# Patient Record
Sex: MALE | Race: WHITE | URBAN - METROPOLITAN AREA
[De-identification: names, ages, dates, MRNs, and addresses within clinical notes are randomized per-mention and may not be internally consistent; named-entity substitution may affect disease eponyms.]

---

## 2022-06-07 ENCOUNTER — OFFICE VISIT (OUTPATIENT)
Dept: SURGERY | Facility: CLINIC | Age: 22
End: 2022-06-07
Payer: MEDICAID

## 2022-06-07 VITALS — DIASTOLIC BLOOD PRESSURE: 83 MMHG | SYSTOLIC BLOOD PRESSURE: 126 MMHG

## 2022-06-07 DIAGNOSIS — N32.81 OAB (OVERACTIVE BLADDER): Primary | ICD-10-CM

## 2022-06-07 PROCEDURE — 3079F DIAST BP 80-89 MM HG: CPT | Performed by: NURSE PRACTITIONER

## 2022-06-07 PROCEDURE — 3074F SYST BP LT 130 MM HG: CPT | Performed by: NURSE PRACTITIONER

## 2022-06-07 PROCEDURE — 99214 OFFICE O/P EST MOD 30 MIN: CPT | Performed by: NURSE PRACTITIONER

## 2022-06-07 RX ORDER — SOLIFENACIN SUCCINATE 5 MG/1
5 TABLET, FILM COATED ORAL DAILY
Qty: 90 TABLET | Refills: 0 | Status: SHIPPED | OUTPATIENT
Start: 2022-06-07

## 2022-06-16 ENCOUNTER — HOSPITAL ENCOUNTER (OUTPATIENT)
Dept: ULTRASOUND IMAGING | Age: 22
Discharge: HOME | End: 2022-06-16
Attending: NURSE PRACTITIONER
Payer: MEDICAID

## 2022-06-16 DIAGNOSIS — R79.89 ELEVATED LFTS: ICD-10-CM

## 2022-06-16 PROCEDURE — 76705 ECHO EXAM OF ABDOMEN: CPT | Performed by: NURSE PRACTITIONER

## 2022-07-05 ENCOUNTER — OFFICE VISIT (OUTPATIENT)
Dept: SURGERY | Facility: CLINIC | Age: 22
End: 2022-07-05
Payer: MEDICAID

## 2022-07-05 DIAGNOSIS — N32.81 OAB (OVERACTIVE BLADDER): Primary | ICD-10-CM

## 2022-07-05 PROCEDURE — 99213 OFFICE O/P EST LOW 20 MIN: CPT | Performed by: NURSE PRACTITIONER

## 2022-08-15 ENCOUNTER — OFFICE VISIT (OUTPATIENT)
Dept: SURGERY | Facility: CLINIC | Age: 22
End: 2022-08-15
Payer: MEDICAID

## 2022-08-15 DIAGNOSIS — N32.81 OAB (OVERACTIVE BLADDER): Primary | ICD-10-CM

## 2022-08-15 PROCEDURE — 51798 US URINE CAPACITY MEASURE: CPT | Performed by: NURSE PRACTITIONER

## 2022-08-15 PROCEDURE — 99213 OFFICE O/P EST LOW 20 MIN: CPT | Performed by: NURSE PRACTITIONER

## 2022-11-13 PROBLEM — K58.2 IRRITABLE BOWEL SYNDROME WITH BOTH CONSTIPATION AND DIARRHEA: Status: ACTIVE | Noted: 2022-11-13

## 2022-11-13 PROBLEM — F41.8 DEPRESSION WITH ANXIETY: Status: ACTIVE | Noted: 2022-11-13

## 2022-11-27 DIAGNOSIS — K58.2 IRRITABLE BOWEL SYNDROME WITH BOTH CONSTIPATION AND DIARRHEA: ICD-10-CM

## 2022-11-28 RX ORDER — DICYCLOMINE HYDROCHLORIDE 10 MG/1
10 CAPSULE ORAL 4 TIMES DAILY
Qty: 120 CAPSULE | Refills: 0 | Status: SHIPPED | OUTPATIENT
Start: 2022-11-28

## 2022-11-28 NOTE — TELEPHONE ENCOUNTER
Refill passed per [facility] protocol.   Requested Prescriptions   Pending Prescriptions Disp Refills    DICYCLOMINE 10 MG Oral Cap [Pharmacy Med Name: DICYCLOMINE 10MG CAPSULES] 120 capsule 0     Sig: TAKE 1 CAPSULE(10 MG) BY MOUTH FOUR TIMES DAILY       Gastrointestional Medication Protocol Passed - 11/27/2022  4:32 PM        Passed - In person appointment or virtual visit in the past 12 mos or appointment in next 3 mos     Recent Outpatient Visits              2 weeks ago Depression with anxiety    [facility] Celi Gaytan PA-C    Office Visit    3 months ago OAB (overactive bladder)    [facility], [address], [employer], APRN    Office Visit    4 months ago OAB (overactive bladder)    [facility], [address], [employer], Maria Elena Guess, APRN    Office Visit    5 months ago OAB (overactive bladder)    [facility], [address], [employer] Gu[employer], APRN    Office Visit    6 months ago Elevated LFTs    [facility], [facility] [address] [facility] [place], [address]    Office Visit                         Recent Outpatient Visits              2 weeks ago Depression with anxiety    [facility] Celi Gaytan PA-C    Office Visit    3 months ago OAB (overactive bladder)    [facility], [address], [employer], APRN    Office Visit    4 months ago OAB (overactive bladder)    [facility], [address], [employer], APRN    Office Visit    5 months ago OAB (overactive bladder)    [facility], [address], [employer], APRN    Office Visit    6 months ago Elevated LFTs    [facility] [address] [facility] [place], [address]    Office Visit

## 2022-12-08 ENCOUNTER — HOSPITAL ENCOUNTER (OUTPATIENT)
Dept: GENERAL RADIOLOGY | Age: 22
Discharge: HOME | End: 2022-12-08
Attending: PHYSICIAN ASSISTANT
Payer: MEDICAID

## 2022-12-08 DIAGNOSIS — M25.572 CHRONIC PAIN OF BOTH ANKLES: ICD-10-CM

## 2022-12-08 DIAGNOSIS — G89.29 CHRONIC PAIN OF BOTH ANKLES: ICD-10-CM

## 2022-12-08 DIAGNOSIS — M25.571 CHRONIC PAIN OF BOTH ANKLES: ICD-10-CM

## 2022-12-08 PROCEDURE — 73610 X-RAY EXAM OF ANKLE: CPT | Performed by: PHYSICIAN ASSISTANT

## 2023-01-03 ENCOUNTER — OFFICE VISIT (OUTPATIENT)
Dept: PODIATRY CLINIC | Facility: CLINIC | Age: 23
End: 2023-01-03
Payer: MEDICAID

## 2023-01-03 DIAGNOSIS — M21.6X1 EQUINUS DEFORMITY OF BOTH FEET: ICD-10-CM

## 2023-01-03 DIAGNOSIS — M76.62 ACHILLES TENDINITIS OF BOTH LOWER EXTREMITIES: Primary | ICD-10-CM

## 2023-01-03 DIAGNOSIS — M21.6X2 EQUINUS DEFORMITY OF BOTH FEET: ICD-10-CM

## 2023-01-03 DIAGNOSIS — M76.61 ACHILLES TENDINITIS OF BOTH LOWER EXTREMITIES: Primary | ICD-10-CM

## 2023-01-03 PROCEDURE — 99203 OFFICE O/P NEW LOW 30 MIN: CPT | Performed by: PODIATRIST

## 2023-01-18 ENCOUNTER — OFFICE VISIT (OUTPATIENT)
Dept: PHYSICAL THERAPY | Age: 23
End: 2023-01-18
Attending: PODIATRIST
Payer: MEDICAID

## 2023-01-18 DIAGNOSIS — M76.62 ACHILLES TENDINITIS OF BOTH LOWER EXTREMITIES: ICD-10-CM

## 2023-01-18 DIAGNOSIS — M76.61 ACHILLES TENDINITIS OF BOTH LOWER EXTREMITIES: ICD-10-CM

## 2023-01-18 DIAGNOSIS — M21.6X1 EQUINUS DEFORMITY OF BOTH FEET: ICD-10-CM

## 2023-01-18 DIAGNOSIS — M21.6X2 EQUINUS DEFORMITY OF BOTH FEET: ICD-10-CM

## 2023-01-18 PROCEDURE — 97110 THERAPEUTIC EXERCISES: CPT

## 2023-01-18 PROCEDURE — 97161 PT EVAL LOW COMPLEX 20 MIN: CPT

## 2023-01-18 NOTE — PATIENT INSTRUCTIONS
HEP    Ankle DF/PF/INV/EV with Blue tband 2 x 10   Standing B heel raises 2 x 10  Standing R/L gastroc stretch at wall 3 x 30 sec holds      2x/day

## 2023-01-25 ENCOUNTER — OFFICE VISIT (OUTPATIENT)
Dept: PHYSICAL THERAPY | Age: 23
End: 2023-01-25
Attending: PODIATRIST
Payer: MEDICAID

## 2023-01-25 PROCEDURE — 97110 THERAPEUTIC EXERCISES: CPT

## 2023-01-25 PROCEDURE — 97112 NEUROMUSCULAR REEDUCATION: CPT

## 2023-01-25 NOTE — PROGRESS NOTES
Diagnosis: Achilles tendinitis of both lower extremities (M76.61,M76.62)  Equinus deformity of both feet (M21.6X1,M21.6X2)           Next MD visit: none scheduled  Fall Risk: standard         Precautions: n/a          Medication Changes since last visit?: No    Subjective: Patient reports no current pain. Objective:     Date: 1/25/2023  Visit #: 2/11 ([facility]) exp. 3/19/2023   HEP   -   Therapeutic Exercise   - standing B heel raises on slant board level 4 2 x 10  - standing B gastroc stretch on slant board level 5 3 x 30 sec holds   - standing R/L hip abduction with hip extension with GTB at shins 2 x 10 ea  - seated B toe/towel scrunches 1 minute x 2 reps  - supine R/L ankle DF with INV, DF with EV with blue Tband 2 x 10 ea  - supine R/L ankle PF with INV, PF with EV with blue Tband 2 x 10 ea  - shuttle machine R/L knee ext/flx 8 bands 2 x 10 ea  - standing R/L heel raises 2 x 10 ea   Manual Therapy   -   Therapeutic Activity   -   Modalities      Neuromuscular re-ed   - R/L SLS 2 x 30 sec holds each  - R/L tandem stance on airex 2 x 30 sec holds each  - R/L SLS with SB toss at wall 2 x 10 ea  - standing on rockerboard AP taps 2 x 20 reps       Assessment: Session focused on global B ankle strengthening and balance challenges.         Plan: continue PT    Charges: Ex 2 NM 1    Total Timed Treatment: 40 min  Total Treatment Time: 40 min

## 2023-01-30 ENCOUNTER — OFFICE VISIT (OUTPATIENT)
Dept: PHYSICAL THERAPY | Age: 23
End: 2023-01-30
Attending: PODIATRIST
Payer: MEDICAID

## 2023-01-30 PROCEDURE — 97112 NEUROMUSCULAR REEDUCATION: CPT

## 2023-01-30 PROCEDURE — 97110 THERAPEUTIC EXERCISES: CPT

## 2023-01-30 NOTE — PROGRESS NOTES
Diagnosis: Achilles tendinitis of both lower extremities (M76.61,M76.62)  Equinus deformity of both feet (M21.6X1,M21.6X2)           Next MD visit: none scheduled  Fall Risk: standard         Precautions: n/a          Medication Changes since last visit?: No    Subjective: Patient reports no current pain. He reports his feet are getting better.         Objective:     Date: 1/30/2023  Visit #: 3/11 ([facility]) exp. 3/19/2023 Date: 1/25/2023  Visit #: 2/11 ([facility]) exp. 3/19/2023   HEP    -   Therapeutic Exercise   - standing B heel raises with IR/ER 1 x 10 ea  - standing B heel raises on slant board level 4 2 x 10  - standing B gastroc stretch on slant board level 5 3 x 30 sec holds   - standing R/L hip abduction with hip extension with GTB at shins 7# DB 2 x 10 ea  - seated B toe/towel scrunches 1 minute x 2 reps  - supine R/L ankle DF with INV, DF with EV with blue Tband 2 x 10 ea  - supine R/L ankle PF with INV, PF with EV with blue Tband 2 x 10 ea  - shuttle machine R/L knee ext/flx 8 bands 2 x 10 ea  - standing B heel raises off stair with R/L eccentric lowering 1 x 10 ea - standing B heel raises on slant board level 4 2 x 10  - standing B gastroc stretch on slant board level 5 3 x 30 sec holds   - standing R/L hip abduction with hip extension with GTB at shins 2 x 10 ea  - seated B toe/towel scrunches 1 minute x 2 reps  - supine R/L ankle DF with INV, DF with EV with blue Tband 2 x 10 ea  - supine R/L ankle PF with INV, PF with EV with blue Tband 2 x 10 ea  - shuttle machine R/L knee ext/flx 8 bands 2 x 10 ea  - standing R/L heel raises 2 x 10 ea   Manual Therapy    -   Therapeutic Activity    -   Modalities       Neuromuscular re-ed   - R/L tandem stance on airex 3 x 30 sec holds each  - R/L SLS with SB toss at wall 2 x 10 ea  - standing on rockerboard AP taps 2 x 20 reps - R/L SLS 2 x 30 sec holds each  - R/L tandem stance on airex 2 x 30 sec holds each  - R/L SLS with SB toss at wall 2 x 10 ea  - standing on rockerboard AP taps 2 x 20 reps       Assessment: Advanced gastroc strengthening with initiation of eccentric heel raises and with multi-angle raises as well.        Plan: continue PT    Charges: Ex 3 NM 1    Total Timed Treatment: 46 min  Total Treatment Time: 46 min

## 2023-02-01 ENCOUNTER — OFFICE VISIT (OUTPATIENT)
Dept: PHYSICAL THERAPY | Age: 23
End: 2023-02-01
Attending: PODIATRIST
Payer: MEDICAID

## 2023-02-01 PROCEDURE — 97112 NEUROMUSCULAR REEDUCATION: CPT

## 2023-02-01 PROCEDURE — 97110 THERAPEUTIC EXERCISES: CPT

## 2023-02-01 NOTE — PROGRESS NOTES
Diagnosis: Achilles tendinitis of both lower extremities (M76.61,M76.62)  Equinus deformity of both feet (M21.6X1,M21.6X2)           Next MD visit: none scheduled  Fall Risk: standard         Precautions: n/a          Medication Changes since last visit?: No    Subjective: Patient reports no current pain. He reports he does have some soreness in the popliteal fossa area/proximal gastroc area today of his right leg.       Objective:     Date: 2/1/2023  Visit #: 4/11 [facility]) exp. 3/19/2023 Date: 1/30/2023  Visit #: 3/11 ([facility]) exp. 3/19/2023 Date: 1/25/2023  Visit #: 2/11 ([facility]) exp. 3/19/2023   HEP     -   Therapeutic Exercise   X 27 minutes  - standing B heel raises on slant board level 4 3 x 10  - standing B gastroc stretch on slant board level 5 3 x 30 sec holds  - R/L forward step ups to 14 inch step with R/L opposite LE march 1 x 10 ea  - standing R/L hip abduction with hip extension with 7# DB and GTB at ankles 2 x 10 ea  - long sitting R/L ankle PF with INV, PF with EV with blue Tband 2 x 10 ea  - long sitting R/L ankle DF with INV, DF with EV with blue Tband 2 x 10 ea - standing B heel raises with IR/ER 1 x 10 ea  - standing B heel raises on slant board level 4 2 x 10  - standing B gastroc stretch on slant board level 5 3 x 30 sec holds   - standing R/L hip abduction with hip extension with GTB at shins 7# DB 2 x 10 ea  - seated B toe/towel scrunches 1 minute x 2 reps  - supine R/L ankle DF with INV, DF with EV with blue Tband 2 x 10 ea  - supine R/L ankle PF with INV, PF with EV with blue Tband 2 x 10 ea  - shuttle machine R/L knee ext/flx 8 bands 2 x 10 ea  - standing B heel raises off stair with R/L eccentric lowering 1 x 10 ea - standing B heel raises on slant board level 4 2 x 10  - standing B gastroc stretch on slant board level 5 3 x 30 sec holds   - standing R/L hip abduction with hip extension with GTB at shins 2 x 10 ea  - seated B toe/towel scrunches 1 minute x 2 reps  - supine R/L ankle DF with INV, DF with EV with blue Tband 2 x 10 ea  - supine R/L ankle PF with INV, PF with EV with blue Tband 2 x 10 ea  - shuttle machine R/L knee ext/flx 8 bands 2 x 10 ea  - standing R/L heel raises 2 x 10 ea   Manual Therapy     -   Therapeutic Activity     -   Modalities        Neuromuscular re-ed   X 15 minutes  - R/L SLS with forward reach 1 x 10  - R/L RDL 1 x 10 ea  - R/L SLS on airex with R/L AP, ML taps 1 x 10 ea  - standing on rockerboard AP taps 20x  - R/L SLS on rockerboard with AP taps 2 x 10 ea - R/L tandem stance on airex 3 x 30 sec holds each  - R/L SLS with SB toss at wall 2 x 10 ea  - standing on rockerboard AP taps 2 x 20 reps - R/L SLS 2 x 30 sec holds each  - R/L tandem stance on airex 2 x 30 sec holds each  - R/L SLS with SB toss at wall 2 x 10 ea  - standing on rockerboard AP taps 2 x 20 reps       Assessment: Advanced single leg dynamic challenges this session along with hip strengthening therapeutic exercises. Patient reporting increased fatigue in left foot.     Plan: continue PT    Charges: Ex 2 NM 1   Total Timed Treatment: 42 min  Total Treatment Time: 42 min

## 2023-02-07 ENCOUNTER — OFFICE VISIT (OUTPATIENT)
Dept: PHYSICAL THERAPY | Age: 23
End: 2023-02-07
Attending: PODIATRIST
Payer: MEDICAID

## 2023-02-07 PROCEDURE — 97110 THERAPEUTIC EXERCISES: CPT

## 2023-02-07 NOTE — PROGRESS NOTES
Diagnosis: Achilles tendinitis of both lower extremities (M76.61,M76.62)  Equinus deformity of both feet (M21.6X1,M21.6X2)           Next MD visit: none scheduled  Fall Risk: standard         Precautions: n/a          Medication Changes since last visit?: No    Subjective: Patient reports no current pain.         Objective:     Date: 2/7/2023  Visit #: 5/11 [facility]) exp. 3/19/2023 Date: 2/1/2023  Visit #: 4/11 ([facility]) exp. 3/19/2023 Date: 1/30/2023  Visit #: 3/11 ([facility]) exp. 3/19/2023   HEP        Therapeutic Exercise   - standing R/L eccentric heel raises off stair 6 inch step 2 x 10 ea  - standing R/L forward step up to 14 inch step with opposite LE march 2 x 10 ea  - standing B gastroc stretch on slant board level 5 3 x 30 sec holds  - standing R/L hip abduction with hip extension with 7# DB and GTB at ankles 2 x 10 ea  - shuttle machine R/L knee ext/flx 8 bands 2 x 10 ea  - long sitting R/L ankle PF with black Tband 2 x 10 ea 5 sec holds  - long sitting R/L ankle INV/EV/DF with black Tband 2 x 10 ea  - shuttle machine R/L knee ext/flx 8 bands 2 x 10 ea X 27 minutes  - standing B heel raises on slant board level 4 3 x 10  - standing B gastroc stretch on slant board level 5 3 x 30 sec holds  - R/L forward step ups to 14 inch step with R/L opposite LE march 1 x 10 ea  - standing R/L hip abduction with hip extension with 7# DB and GTB at ankles 2 x 10 ea  - long sitting R/L ankle PF with INV, PF with EV with blue Tband 2 x 10 ea  - long sitting R/L ankle DF with INV, DF with EV with blue Tband 2 x 10 ea - standing B heel raises with IR/ER 1 x 10 ea  - standing B heel raises on slant board level 4 2 x 10  - standing B gastroc stretch on slant board level 5 3 x 30 sec holds   - standing R/L hip abduction with hip extension with GTB at shins 7# DB 2 x 10 ea  - seated B toe/towel scrunches 1 minute x 2 reps  - supine R/L ankle DF with INV, DF with EV with blue Tband 2 x 10 ea  - supine R/L ankle PF with INV, PF with EV with blue Tband 2 x 10 ea  - shuttle machine R/L knee ext/flx 8 bands 2 x 10 ea  - standing B heel raises off stair with R/L eccentric lowering 1 x 10 ea   Manual Therapy        Therapeutic Activity        Modalities        Neuromuscular re-ed   - standing R/L SLS with 5 cone taps 5 reps each  - standing on rockerboard AP taps 2 x 20 reps X 15 minutes  - R/L SLS with forward reach 1 x 10  - R/L RDL 1 x 10 ea  - R/L SLS on airex with R/L AP, ML taps 1 x 10 ea  - standing on rockerboard AP taps 20x  - R/L SLS on rockerboard with AP taps 2 x 10 ea - R/L tandem stance on airex 3 x 30 sec holds each  - R/L SLS with SB toss at wall 2 x 10 ea  - standing on rockerboard AP taps 2 x 20 reps       Assessment: Advanced reps of all therapeutic exercises with focus on continuation of gastroc and hip strengthening.     Plan: continue PT    Charges: Ex 3   Total Timed Treatment: 45 min  Total Treatment Time: 45 min

## 2023-02-09 ENCOUNTER — OFFICE VISIT (OUTPATIENT)
Dept: PHYSICAL THERAPY | Age: 23
End: 2023-02-09
Attending: PODIATRIST
Payer: MEDICAID

## 2023-02-09 PROCEDURE — 97110 THERAPEUTIC EXERCISES: CPT

## 2023-02-09 NOTE — PROGRESS NOTES
Diagnosis: Achilles tendinitis of both lower extremities (M76.61,M76.62)  Equinus deformity of both feet (M21.6X1,M21.6X2)           Next MD visit: none scheduled  Fall Risk: standard         Precautions: n/a          Medication Changes since last visit?: No    Subjective: Patient reports 1-2/10 medial ankle pain today.        Objective:     Date: 2/9/2023  Visit #: 6/11 ([facility]) exp. 3/19/2023 Date: 2/7/2023  Visit #: 5/11 ([facility]) exp. 3/19/2023 Date: 2/1/2023  Visit #: 4/11 ([facility]) exp. 3/19/2023   HEP        Therapeutic Exercise   - standing B gastroc stretch on slant board level 5 3 x 30 sec holds  - standing R/L lateral lunges with GTB above knees 1 x 10 ea  - standing R/L static lunge 10x ea  - long sitting R/L ankle PF with black Tband 2 x 10 ea 5 sec holds  - long sitting R/L ankle INV/EV/DF with black Tband 2 x 10 ea  - sidelying R/L hip abduction with GTB above knees 2 x 10 ea  - shuttle machine R/L knee ext/flx 8 bands 2 x 10 ea  - shuttle machine B heel raises on 4-5 bands 2 x 10 - standing R/L eccentric heel raises off stair 6 inch step 2 x 10 ea  - standing R/L forward step up to 14 inch step with opposite LE march 2 x 10 ea  - standing B gastroc stretch on slant board level 5 3 x 30 sec holds  - standing R/L hip abduction with hip extension with 7# DB and GTB at ankles 2 x 10 ea  - shuttle machine R/L knee ext/flx 8 bands 2 x 10 ea  - long sitting R/L ankle PF with black Tband 2 x 10 ea 5 sec holds  - long sitting R/L ankle INV/EV/DF with black Tband 2 x 10 ea  - shuttle machine R/L knee ext/flx 8 bands 2 x 10 ea X 27 minutes  - standing B heel raises on slant board level 4 3 x 10  - standing B gastroc stretch on slant board level 5 3 x 30 sec holds  - R/L forward step ups to 14 inch step with R/L opposite LE march 1 x 10 ea  - standing R/L hip abduction with hip extension with 7# DB and GTB at ankles 2 x 10 ea  - long sitting R/L ankle PF with INV, PF with EV with blue Tband 2 x 10 ea  - long sitting R/L ankle DF with INV, DF with EV with blue Tband 2 x 10 ea   Manual Therapy        Therapeutic Activity        Modalities        Neuromuscular re-ed   - R/L SLS on airex with R/L AP, ML taps 2 x 10 ea  - R/L SLS with SB toss at wall  20x ea - standing R/L SLS with 5 cone taps 5 reps each  - standing on rockerboard AP taps 2 x 20 reps X 15 minutes  - R/L SLS with forward reach 1 x 10  - R/L RDL 1 x 10 ea  - R/L SLS on airex with R/L AP, ML taps 1 x 10 ea  - standing on rockerboard AP taps 20x  - R/L SLS on rockerboard with AP taps 2 x 10 ea       Assessment: Initiated static and lateral lunges today to promote global LE strength.      Plan: continue PT -  Trial IASTYM; foam roll next session     Charges: Ex 3   Total Timed Treatment: 44 min  Total Treatment Time: 44 min

## 2023-02-15 ENCOUNTER — OFFICE VISIT (OUTPATIENT)
Dept: PHYSICAL THERAPY | Age: 23
End: 2023-02-15
Attending: PODIATRIST
Payer: MEDICAID

## 2023-02-15 PROCEDURE — 97110 THERAPEUTIC EXERCISES: CPT

## 2023-02-15 PROCEDURE — 97140 MANUAL THERAPY 1/> REGIONS: CPT

## 2023-02-15 NOTE — PROGRESS NOTES
Diagnosis: Achilles tendinitis of both lower extremities (M76.61,M76.62)  Equinus deformity of both feet (M21.6X1,M21.6X2)           Next MD visit: none scheduled  Fall Risk: standard         Precautions: n/a          Medication Changes since last visit?: No    Subjective: Patient reports 0/10 B achilles pain today. He reports his achilles tendons are doing better.        Objective:     Date: 2/15/2023  Visit #: 7/11 [facility]) exp. 3/19/2023 Date: 2/9/2023  Visit #: 6/11 ([facility]) exp. 3/19/2023 Date: 2/7/2023  Visit #: 5/11 ([facility]) exp. 3/19/2023   HEP        Therapeutic Exercise   - standing B gastroc stretch on slant board level 5 3 x 30 sec holds  - standing squats with 5# DB 10x  - standing R/L static lunge 2 x 10 ea  - shuttle machine R/L knee ext/flx 8 bands 2 x 15 ea  - shuttle machine B heel raises on 5 bands 2 x 10   - standing B gastroc stretch on slant board level 5 3 x 30 sec holds  - standing R/L lateral lunges with GTB above knees 1 x 10 ea  - standing R/L static lunge 10x ea  - long sitting R/L ankle PF with black Tband 2 x 10 ea 5 sec holds  - long sitting R/L ankle INV/EV/DF with black Tband 2 x 10 ea  - sidelying R/L hip abduction with GTB above knees 2 x 10 ea  - shuttle machine R/L knee ext/flx 8 bands 2 x 10 ea  - shuttle machine B heel raises on 4-5 bands 2 x 10 - standing R/L eccentric heel raises off stair 6 inch step 2 x 10 ea  - standing R/L forward step up to 14 inch step with opposite LE march 2 x 10 ea  - standing B gastroc stretch on slant board level 5 3 x 30 sec holds  - standing R/L hip abduction with hip extension with 7# DB and GTB at ankles 2 x 10 ea  - shuttle machine R/L knee ext/flx 8 bands 2 x 10 ea  - long sitting R/L ankle PF with black Tband 2 x 10 ea 5 sec holds  - long sitting R/L ankle INV/EV/DF with black Tband 2 x 10 ea  - shuttle machine R/L knee ext/flx 8 bands 2 x 10 ea   Manual Therapy   - IASTYM R/L achilles tendons x 10 minutes     Therapeutic Activity        Modalities        Neuromuscular re-ed   - R/L SLS with SB toss at wall 30x ea - R/L SLS on airex with R/L AP, ML taps 2 x 10 ea  - R/L SLS with SB toss at wall  20x ea - standing R/L SLS with 5 cone taps 5 reps each  - standing on rockerboard AP taps 2 x 20 reps       Assessment: Initiated IASTYM to assist with improving tendon structure and continued with progression of global LE strength.       Plan: continue PT     Charges: Ex 2 Man 1  Total Timed Treatment: 44 min  Total Treatment Time: 44 min

## 2023-02-16 ENCOUNTER — APPOINTMENT (OUTPATIENT)
Dept: PHYSICAL THERAPY | Age: 23
End: 2023-02-16
Attending: PODIATRIST
Payer: MEDICAID

## 2023-02-20 ENCOUNTER — OFFICE VISIT (OUTPATIENT)
Dept: PHYSICAL THERAPY | Age: 23
End: 2023-02-20
Attending: PODIATRIST
Payer: MEDICAID

## 2023-02-20 PROCEDURE — 97110 THERAPEUTIC EXERCISES: CPT

## 2023-02-20 PROCEDURE — 97140 MANUAL THERAPY 1/> REGIONS: CPT

## 2023-02-20 NOTE — PROGRESS NOTES
Diagnosis: Achilles tendinitis of both lower extremities (M76.61,M76.62)  Equinus deformity of both feet (M21.6X1,M21.6X2)           Next MD visit: none scheduled  Fall Risk: standard         Precautions: n/a          Medication Changes since last visit?: No    Subjective: Patient reports his achilles are doing well. Objective:     Date: 2/20/2023  Visit #: 8/11 [facility]) exp. 3/19/2023   HEP      Therapeutic Exercise   - sidelying R/L hip abduction with Blue Tband above knees 2 x 10 ea  - sidelying R/L clams with Blue Tband above knees 1 x 10 ea  - sidestepping with GTB at ankles 4 x 25 feet   - monster walking with GTB at ankles 4 x 25 feet  - R/L SL RDL with 5# DB 2 x 10 ea  - standing squats with 5# DB 10x  - shuttle machine R/L knee ext/flx 8 bands 2 x 15 ea  - standing B heel raises on slant board level 4 3 x 10  - standing B gastroc stretch on slant board level 5 3 x 30 sec holds   - standing R/L eccentric heel raises off 6 inch stair 2 x 10      Manual Therapy   - IASTYM R/L achilles tendons x 10 minutes   Therapeutic Activity      Modalities      Neuromuscular re-ed          Assessment: Advanced global hip strengthening interventions with initiation of sidestepping and monster walking. Pt with no complaints of pain during session.      Plan: continue PT     Charges: Ex 2 Man 1  Total Timed Treatment: 45 min  Total Treatment Time: 45 min

## 2023-02-22 ENCOUNTER — OFFICE VISIT (OUTPATIENT)
Dept: PHYSICAL THERAPY | Age: 23
End: 2023-02-22
Attending: PODIATRIST
Payer: MEDICAID

## 2023-02-22 PROCEDURE — 97110 THERAPEUTIC EXERCISES: CPT

## 2023-02-22 PROCEDURE — 97140 MANUAL THERAPY 1/> REGIONS: CPT

## 2023-02-22 NOTE — PROGRESS NOTES
Diagnosis: Achilles tendinitis of both lower extremities (M76.61,M76.62)  Equinus deformity of both feet (M21.6X1,M21.6X2)           Next MD visit: none scheduled  Fall Risk: standard         Precautions: n/a          Medication Changes since last visit?: No    Subjective: Patient reports his achilles are doing well. He reports no pain this morning, but some soreness in the R/L medial gastroc region. He reports he will have 3/10 distal achilles pain with single leg heel raises and at times walking on uneven surfaces. Pt does not report pain with community ambulation on even ground.        Objective:     Date: 2/22/2023  Visit #: 9/11 [facility]) exp. 3/19/2023 Date: 2/20/2023  Visit #: 8/11 ([facility]) exp. 3/19/2023   HEP       Therapeutic Exercise   - sidelying R/L clams with Blue Tband above knees 2 x 10 ea  - shuttle machine R/L knee ext/flx 8 bands 2 x 15 ea  - standing B gastroc stretch on slant board level 5 3 x 30 sec holds   - standing R/L heel raises on slant board level 4 2 x 10  - supine R/L ankle PF with Black Tband 2 x 10 5 sec holds  - supine R/L ankle DF with INV & DF with EV with black Tband 2 x 10 ea  - supine R/L ankle PF with INV & PF with EV with black Tband 2 x 10 ea  - dead bug with OH reach 8# DB 2 x 10  - dead bug with iso push 1 x 10 ea - sidelying R/L hip abduction with Blue Tband above knees 2 x 10 ea  - sidelying R/L clams with Blue Tband above knees 1 x 10 ea  - sidestepping with GTB at ankles 4 x 25 feet   - monster walking with GTB at ankles 4 x 25 feet  - R/L SL RDL with 5# DB 2 x 10 ea  - standing squats with 5# DB 10x  - shuttle machine R/L knee ext/flx 8 bands 2 x 15 ea  - standing B heel raises on slant board level 4 3 x 10  - standing B gastroc stretch on slant board level 5 3 x 30 sec holds   - standing R/L eccentric heel raises off 6 inch stair 2 x 10      Manual Therapy   - IASTYM R/L achilles tendons x 10 minutes - IASTYM R/L achilles tendons x 10 minutes Therapeutic Activity       Modalities       Neuromuscular re-ed           Assessment: Initiated advanced core stabilization therapeutic exercises this session.     Plan: continue PT     Charges: Ex 2 Man 1  Total Timed Treatment: 45 min  Total Treatment Time: 45 min

## 2023-03-01 ENCOUNTER — OFFICE VISIT (OUTPATIENT)
Dept: PHYSICAL THERAPY | Age: 23
End: 2023-03-01
Attending: PODIATRIST
Payer: MEDICAID

## 2023-03-01 PROCEDURE — 97110 THERAPEUTIC EXERCISES: CPT

## 2023-03-01 PROCEDURE — 97140 MANUAL THERAPY 1/> REGIONS: CPT

## 2023-03-01 NOTE — PROGRESS NOTES
Diagnosis: Achilles tendinitis of both lower extremities (M76.61,M76.62)  Equinus deformity of both feet (M21.6X1,M21.6X2)           Next MD visit: none scheduled  Fall Risk: standard         Precautions: n/a          Medication Changes since last visit?: No    Subjective: Patient reports 0/10 current achilles pain. He reports he noticed having pain in his right > left achilles after standing around 2 hours.        Objective:     Date: 3/1/2023  Visit #: 10/11 [facility]) exp. 3/19/2023 Date: 2/22/2023  Visit #: 9/11 ([facility]) exp. 3/19/2023 Date: 2/20/2023  Visit #: 8/11 ([facility]) exp. 3/19/2023   HEP        Therapeutic Exercise   - standing B gastroc stretch on slant board level 5 3 x 30 sec holds   - standing R/L heel raises on slant board level 4 2 x 10 ea  - supine R/L ankle PF with Black Tband 2 x 10 5 sec holds  - supine R/L ankle DF with INV & DF with EV with black Tband 2 x 10 ea  - supine R/L ankle PF with INV & PF with EV with black Tband 2 x 10 ea  - supine R/L SL bridges 2 x 10 ea  - dead bug with OH reach with B knee ext/flx 8# DB 2 x 10  - prone R/L hip extension 2 x 10 ea  - standing squats with 8# DB press 1 x 10  - R/L retro lunge with march 1 x 10 ea - sidelying R/L clams with Blue Tband above knees 2 x 10 ea  - shuttle machine R/L knee ext/flx 8 bands 2 x 15 ea  - standing B gastroc stretch on slant board level 5 3 x 30 sec holds   - standing R/L heel raises on slant board level 4 2 x 10  - supine R/L ankle PF with Black Tband 2 x 10 5 sec holds  - supine R/L ankle DF with INV & DF with EV with black Tband 2 x 10 ea  - supine R/L ankle PF with INV & PF with EV with black Tband 2 x 10 ea  - dead bug with OH reach 8# DB 2 x 10  - dead bug with iso push 1 x 10 ea - sidelying R/L hip abduction with Blue Tband above knees 2 x 10 ea  - sidelying R/L clams with Blue Tband above knees 1 x 10 ea  - sidestepping with GTB at ankles 4 x 25 feet   - monster walking with GTB at ankles 4 x 25 feet  - R/L SL RDL with 5# DB 2 x 10 ea  - standing squats with 5# DB 10x  - shuttle machine R/L knee ext/flx 8 bands 2 x 15 ea  - standing B heel raises on slant board level 4 3 x 10  - standing B gastroc stretch on slant board level 5 3 x 30 sec holds   - standing R/L eccentric heel raises off 6 inch stair 2 x 10      Manual Therapy   - IASTYM R/L achilles tendons x 10 minutes - IASTYM R/L achilles tendons x 10 minutes - IASTYM R/L achilles tendons x 10 minutes   Therapeutic Activity        Modalities        Neuromuscular re-ed            Assessment: Advanced gluteal and hip strengthening interventions today.      Plan: continue PT - one more session and transition to HEP    Charges: Ex 2 Man 1  Total Timed Treatment: 42 min  Total Treatment Time: 42 min

## 2023-03-08 ENCOUNTER — APPOINTMENT (OUTPATIENT)
Dept: PHYSICAL THERAPY | Age: 23
End: 2023-03-08
Attending: PODIATRIST
Payer: MEDICAID

## 2023-03-17 ENCOUNTER — NURSE TRIAGE (OUTPATIENT)
Dept: FAMILY MEDICINE CLINIC | Facility: CLINIC | Age: 23
End: 2023-03-17

## 2023-03-17 ENCOUNTER — TELEMEDICINE (OUTPATIENT)
Dept: FAMILY MEDICINE CLINIC | Facility: CLINIC | Age: 23
End: 2023-03-17

## 2023-03-17 DIAGNOSIS — K58.2 IRRITABLE BOWEL SYNDROME WITH BOTH CONSTIPATION AND DIARRHEA: Primary | ICD-10-CM

## 2023-03-17 PROCEDURE — 99214 OFFICE O/P EST MOD 30 MIN: CPT | Performed by: NURSE PRACTITIONER

## 2023-03-17 RX ORDER — DICYCLOMINE HYDROCHLORIDE 10 MG/1
10 CAPSULE ORAL 4 TIMES DAILY
Qty: 120 CAPSULE | Refills: 2 | Status: SHIPPED | OUTPATIENT
Start: 2023-03-17

## 2023-03-29 ENCOUNTER — APPOINTMENT (OUTPATIENT)
Dept: PHYSICAL THERAPY | Age: 23
End: 2023-03-29
Attending: NURSE PRACTITIONER
Payer: MEDICAID

## 2023-04-07 ENCOUNTER — OFFICE VISIT (OUTPATIENT)
Dept: FAMILY MEDICINE CLINIC | Facility: CLINIC | Age: 23
End: 2023-04-07

## 2023-04-07 VITALS
HEIGHT: 69.5 IN | BODY MASS INDEX: 29.83 KG/M2 | HEART RATE: 88 BPM | SYSTOLIC BLOOD PRESSURE: 118 MMHG | WEIGHT: 206 LBS | DIASTOLIC BLOOD PRESSURE: 82 MMHG

## 2023-04-07 DIAGNOSIS — F41.8 DEPRESSION WITH ANXIETY: ICD-10-CM

## 2023-04-07 DIAGNOSIS — L73.9 FOLLICULITIS: ICD-10-CM

## 2023-04-07 DIAGNOSIS — R22.2 ABDOMINAL WALL LUMP: Primary | ICD-10-CM

## 2023-04-07 PROCEDURE — 99214 OFFICE O/P EST MOD 30 MIN: CPT | Performed by: NURSE PRACTITIONER

## 2023-04-07 PROCEDURE — 3074F SYST BP LT 130 MM HG: CPT | Performed by: NURSE PRACTITIONER

## 2023-04-07 PROCEDURE — 3079F DIAST BP 80-89 MM HG: CPT | Performed by: NURSE PRACTITIONER

## 2023-04-07 PROCEDURE — 3008F BODY MASS INDEX DOCD: CPT | Performed by: NURSE PRACTITIONER

## 2023-04-07 RX ORDER — CITALOPRAM 40 MG/1
40 TABLET ORAL DAILY
COMMUNITY
Start: 2023-03-07

## 2023-04-07 RX ORDER — AMITRIPTYLINE HYDROCHLORIDE 25 MG/1
25 TABLET, FILM COATED ORAL NIGHTLY
Qty: 90 TABLET | Refills: 1 | Status: SHIPPED | OUTPATIENT
Start: 2023-04-07

## 2023-04-07 RX ORDER — CEPHALEXIN 500 MG/1
500 CAPSULE ORAL 2 TIMES DAILY
Qty: 14 CAPSULE | Refills: 0 | Status: SHIPPED | OUTPATIENT
Start: 2023-04-07 | End: 2023-04-14

## 2023-04-07 RX ORDER — AMITRIPTYLINE HYDROCHLORIDE 10 MG/1
10 TABLET, FILM COATED ORAL NIGHTLY
COMMUNITY
End: 2023-04-07

## 2023-04-25 ENCOUNTER — TELEPHONE (OUTPATIENT)
Dept: FAMILY MEDICINE CLINIC | Facility: CLINIC | Age: 23
End: 2023-04-25

## 2023-04-25 NOTE — TELEPHONE ENCOUNTER
Patient and mother Leslie End calling (identified name and ). They received a letter from insurance stating the CT of abdomen was not approved. CT was rescheduled for . Routing to [facility] to assist. Thank you.      Best callback number:  [phone]

## 2023-05-01 ENCOUNTER — TELEPHONE (OUTPATIENT)
Dept: ADMINISTRATIVE | Age: 23
End: 2023-05-01

## 2023-05-01 DIAGNOSIS — R22.2 ABDOMINAL WALL LUMP: Primary | ICD-10-CM

## 2023-05-01 NOTE — TELEPHONE ENCOUNTER
Hello,    Requested prior authorization of CT Abdomen (CPT=74150) has been denied [facility] after [facility] review. Please note: Ultrasound results were provided to payer prior to first level denial.  Denial Rationale: \"Based on [facility] Guidelines Section(s): Abdominal Wall Mass (AB 13.1), we cannot approve this request. Your healthcare provider told us that you have a mass (growth) in the wall of your belly (abdomen). The request cannot be approved because:    Results of an ultrasound (a picture study using sound waves) must show one of the following.   -An indeterminate (unclear) mass or growth. -Suspected growth containing disease. \"    Case is first level appeal eligible. Appeal instructions may be found within denial letter below. Please note: patient is required to sign designated personal representative form for payer to accept and process appeal on [facility]/ordering care team behalf. Please advise,  Thank you!

## 2023-05-02 NOTE — TELEPHONE ENCOUNTER
Called patient, no answer. Unable to leave message marks busy tone. CT was denied by insurance. Ultrasound needs to be completed prior to being eligible on getting CT scan. Ultrasound has been ordered. Patient may call and schedule appointment at [phone].

## 2023-05-16 ENCOUNTER — NURSE TRIAGE (OUTPATIENT)
Dept: FAMILY MEDICINE CLINIC | Facility: CLINIC | Age: 23
End: 2023-05-16

## 2023-05-16 ENCOUNTER — OFFICE VISIT (OUTPATIENT)
Dept: INTERNAL MEDICINE CLINIC | Facility: CLINIC | Age: 23
End: 2023-05-16

## 2023-05-16 VITALS
SYSTOLIC BLOOD PRESSURE: 132 MMHG | BODY MASS INDEX: 30.12 KG/M2 | DIASTOLIC BLOOD PRESSURE: 83 MMHG | WEIGHT: 208 LBS | HEART RATE: 84 BPM | HEIGHT: 69.5 IN
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
## 2023-05-16 PROCEDURE — 3079F DIAST BP 80-89 MM HG: CPT | Performed by: NURSE PRACTITIONER

## 2023-05-16 PROCEDURE — 3075F SYST BP GE 130 - 139MM HG: CPT | Performed by: NURSE PRACTITIONER

## 2023-05-16 PROCEDURE — 99213 OFFICE O/P EST LOW 20 MIN: CPT | Performed by: NURSE PRACTITIONER

## 2023-05-16 PROCEDURE — 3008F BODY MASS INDEX DOCD: CPT | Performed by: NURSE PRACTITIONER

## 2023-05-16 RX ORDER — QUETIAPINE FUMARATE 25 MG/1
25 TABLET, FILM COATED ORAL NIGHTLY
COMMUNITY
Start: 2023-05-04

## 2023-05-16 RX ORDER — CITALOPRAM 10 MG/1
TABLET ORAL
COMMUNITY
Start: 2023-01-07

## 2023-05-16 RX ORDER — BUPROPION HYDROCHLORIDE 150 MG/1
TABLET ORAL
COMMUNITY
Start: 2023-05-15

## 2023-05-16 RX ORDER — AMOXICILLIN AND CLAVULANATE POTASSIUM 875; 125 MG/1; MG/1
1 TABLET, FILM COATED ORAL 2 TIMES DAILY
Qty: 20 TABLET | Refills: 0 | Status: SHIPPED | OUTPATIENT
Start: 2023-05-16 | End: 2023-05-26

## 2023-05-16 RX ORDER — GABAPENTIN 300 MG/1
CAPSULE ORAL
COMMUNITY
Start: 2023-05-15

## 2023-05-17 ENCOUNTER — LAB ENCOUNTER (OUTPATIENT)
Dept: LAB | Age: 23
End: 2023-05-17
Attending: NURSE PRACTITIONER
Payer: MEDICAID

## 2023-05-17 ENCOUNTER — OFFICE VISIT (OUTPATIENT)
Dept: FAMILY MEDICINE CLINIC | Facility: CLINIC | Age: 23
End: 2023-05-17

## 2023-05-17 VITALS
HEART RATE: 83 BPM | DIASTOLIC BLOOD PRESSURE: 69 MMHG | WEIGHT: 208 LBS | HEIGHT: 69.5 IN | SYSTOLIC BLOOD PRESSURE: 110 MMHG | BODY MASS INDEX: 30.12 KG/M2

## 2023-05-17 DIAGNOSIS — E03.8 SUBCLINICAL HYPOTHYROIDISM: ICD-10-CM

## 2023-05-17 DIAGNOSIS — D75.1 POLYCYTHEMIA: ICD-10-CM

## 2023-05-17 DIAGNOSIS — R79.89 ELEVATED LFTS: ICD-10-CM

## 2023-05-17 DIAGNOSIS — Z00.00 WELL ADULT EXAM: Primary | ICD-10-CM

## 2023-05-17 DIAGNOSIS — Z00.00 WELL ADULT EXAM: ICD-10-CM

## 2023-05-17 DIAGNOSIS — E55.9 VITAMIN D DEFICIENCY: ICD-10-CM

## 2023-05-17 LAB
ALBUMIN SERPL-MCNC: 4.2 G/DL (ref 3.4–5)
ALBUMIN/GLOB SERPL: 1.1 {RATIO} (ref 1–2)
ALP LIVER SERPL-CCNC: 84 U/L
ALT SERPL-CCNC: 80 U/L
ANION GAP SERPL CALC-SCNC: 4 MMOL/L (ref 0–18)
AST SERPL-CCNC: 29 U/L (ref 15–37)
BASOPHILS # BLD AUTO: 0.03 X10(3) UL (ref 0–0.2)
BASOPHILS NFR BLD AUTO: 0.4 %
BILIRUB SERPL-MCNC: 0.6 MG/DL (ref 0.1–2)
BUN BLD-MCNC: 12 MG/DL (ref 7–18)
BUN/CREAT SERPL: 11.1 (ref 10–20)
CALCIUM BLD-MCNC: 9.4 MG/DL (ref 8.5–10.1)
CHLORIDE SERPL-SCNC: 108 MMOL/L (ref 98–112)
CHOLEST SERPL-MCNC: 198 MG/DL (ref ?–200)
CO2 SERPL-SCNC: 29 MMOL/L (ref 21–32)
CREAT BLD-MCNC: 1.08 MG/DL
DEPRECATED RDW RBC AUTO: 36.3 FL (ref 35.1–46.3)
EOSINOPHIL # BLD AUTO: 0.43 X10(3) UL (ref 0–0.7)
EOSINOPHIL NFR BLD AUTO: 6.3 %
ERYTHROCYTE [DISTWIDTH] IN BLOOD BY AUTOMATED COUNT: 11.5 % (ref 11–15)
EST. AVERAGE GLUCOSE BLD GHB EST-MCNC: 111 MG/DL (ref 68–126)
FASTING PATIENT LIPID ANSWER: YES
FASTING STATUS PATIENT QL REPORTED: YES
GFR SERPLBLD BASED ON 1.73 SQ M-ARVRAT: 98 ML/MIN/1.73M2 (ref 60–?)
GLOBULIN PLAS-MCNC: 3.8 G/DL (ref 2.8–4.4)
GLUCOSE BLD-MCNC: 95 MG/DL (ref 70–99)
HBA1C MFR BLD: 5.5 % (ref ?–5.7)
HCT VFR BLD AUTO: 50.4 %
HDLC SERPL-MCNC: 41 MG/DL (ref 40–59)
HGB BLD-MCNC: 17.4 G/DL
IMM GRANULOCYTES # BLD AUTO: 0.01 X10(3) UL (ref 0–1)
IMM GRANULOCYTES NFR BLD: 0.1 %
LDLC SERPL CALC-MCNC: 137 MG/DL (ref ?–100)
LYMPHOCYTES # BLD AUTO: 2.1 X10(3) UL (ref 1–4)
LYMPHOCYTES NFR BLD AUTO: 30.7 %
MCH RBC QN AUTO: 29.6 PG (ref 26–34)
MCHC RBC AUTO-ENTMCNC: 34.5 G/DL (ref 31–37)
MCV RBC AUTO: 85.9 FL
MONOCYTES # BLD AUTO: 0.4 X10(3) UL (ref 0.1–1)
MONOCYTES NFR BLD AUTO: 5.9 %
NEUTROPHILS # BLD AUTO: 3.86 X10 (3) UL (ref 1.5–7.7)
NEUTROPHILS # BLD AUTO: 3.86 X10(3) UL (ref 1.5–7.7)
NEUTROPHILS NFR BLD AUTO: 56.6 %
NONHDLC SERPL-MCNC: 157 MG/DL (ref ?–130)
OSMOLALITY SERPL CALC.SUM OF ELEC: 292 MOSM/KG (ref 275–295)
PLATELET # BLD AUTO: 301 10(3)UL (ref 150–450)
POTASSIUM SERPL-SCNC: 4.1 MMOL/L (ref 3.5–5.1)
PROT SERPL-MCNC: 8 G/DL (ref 6.4–8.2)
RBC # BLD AUTO: 5.87 X10(6)UL
SODIUM SERPL-SCNC: 141 MMOL/L (ref 136–145)
T4 FREE SERPL-MCNC: 1 NG/DL (ref 0.8–1.7)
TRIGL SERPL-MCNC: 112 MG/DL (ref 30–149)
TSI SER-ACNC: 3.78 MIU/ML (ref 0.36–3.74)
VIT D+METAB SERPL-MCNC: 75.9 NG/ML (ref 30–100)
VLDLC SERPL CALC-MCNC: 21 MG/DL (ref 0–30)
WBC # BLD AUTO: 6.8 X10(3) UL (ref 4–11)

## 2023-05-17 PROCEDURE — 84439 ASSAY OF FREE THYROXINE: CPT

## 2023-05-17 PROCEDURE — 83036 HEMOGLOBIN GLYCOSYLATED A1C: CPT

## 2023-05-17 PROCEDURE — 84443 ASSAY THYROID STIM HORMONE: CPT

## 2023-05-17 PROCEDURE — 3008F BODY MASS INDEX DOCD: CPT | Performed by: NURSE PRACTITIONER

## 2023-05-17 PROCEDURE — 85025 COMPLETE CBC W/AUTO DIFF WBC: CPT

## 2023-05-17 PROCEDURE — 80053 COMPREHEN METABOLIC PANEL: CPT

## 2023-05-17 PROCEDURE — 3074F SYST BP LT 130 MM HG: CPT | Performed by: NURSE PRACTITIONER

## 2023-05-17 PROCEDURE — 36415 COLL VENOUS BLD VENIPUNCTURE: CPT

## 2023-05-17 PROCEDURE — 82306 VITAMIN D 25 HYDROXY: CPT

## 2023-05-17 PROCEDURE — 99395 PREV VISIT EST AGE 18-39: CPT | Performed by: NURSE PRACTITIONER

## 2023-05-17 PROCEDURE — 3078F DIAST BP <80 MM HG: CPT | Performed by: NURSE PRACTITIONER

## 2023-05-17 PROCEDURE — 80061 LIPID PANEL: CPT

## 2023-06-15 ENCOUNTER — HOSPITAL ENCOUNTER (OUTPATIENT)
Dept: ULTRASOUND IMAGING | Age: 23
Discharge: HOME | End: 2023-06-15
Attending: NURSE PRACTITIONER
Payer: MEDICAID

## 2023-06-15 ENCOUNTER — TELEPHONE (OUTPATIENT)
Dept: FAMILY MEDICINE CLINIC | Facility: CLINIC | Age: 23
End: 2023-06-15

## 2023-06-15 DIAGNOSIS — R22.2 ABDOMINAL WALL LUMP: Primary | ICD-10-CM

## 2023-06-15 DIAGNOSIS — R22.2 ABDOMINAL WALL LUMP: ICD-10-CM

## 2023-06-15 PROCEDURE — 76705 ECHO EXAM OF ABDOMEN: CPT | Performed by: NURSE PRACTITIONER

## 2023-06-16 ENCOUNTER — OFFICE VISIT (OUTPATIENT)
Dept: FAMILY MEDICINE CLINIC | Facility: CLINIC | Age: 23
End: 2023-06-16

## 2023-06-16 VITALS
WEIGHT: 211 LBS | DIASTOLIC BLOOD PRESSURE: 78 MMHG | BODY MASS INDEX: 30.55 KG/M2 | SYSTOLIC BLOOD PRESSURE: 118 MMHG | HEIGHT: 69.5 IN | HEART RATE: 80 BPM

## 2023-06-16 DIAGNOSIS — M76.62 ACHILLES TENDINITIS OF BOTH LOWER EXTREMITIES: Primary | ICD-10-CM

## 2023-06-16 DIAGNOSIS — M76.61 ACHILLES TENDINITIS OF BOTH LOWER EXTREMITIES: Primary | ICD-10-CM

## 2023-06-16 PROCEDURE — 3074F SYST BP LT 130 MM HG: CPT | Performed by: PHYSICIAN ASSISTANT

## 2023-06-16 PROCEDURE — 3008F BODY MASS INDEX DOCD: CPT | Performed by: PHYSICIAN ASSISTANT

## 2023-06-16 PROCEDURE — 3078F DIAST BP <80 MM HG: CPT | Performed by: PHYSICIAN ASSISTANT

## 2023-06-16 PROCEDURE — 99213 OFFICE O/P EST LOW 20 MIN: CPT | Performed by: PHYSICIAN ASSISTANT

## 2023-06-16 RX ORDER — METHYLPREDNISOLONE 4 MG/1
TABLET ORAL
Qty: 21 EACH | Refills: 0 | Status: SHIPPED | OUTPATIENT
Start: 2023-06-16

## 2023-06-27 ENCOUNTER — OFFICE VISIT (OUTPATIENT)
Dept: PODIATRY CLINIC | Facility: CLINIC | Age: 23
End: 2023-06-27

## 2023-06-27 DIAGNOSIS — M76.61 ACHILLES TENDINITIS OF BOTH LOWER EXTREMITIES: Primary | ICD-10-CM

## 2023-06-27 DIAGNOSIS — M21.6X1 EQUINUS DEFORMITY OF BOTH FEET: ICD-10-CM

## 2023-06-27 DIAGNOSIS — M76.62 ACHILLES TENDINITIS OF BOTH LOWER EXTREMITIES: Primary | ICD-10-CM

## 2023-06-27 DIAGNOSIS — M21.6X2 EQUINUS DEFORMITY OF BOTH FEET: ICD-10-CM

## 2023-06-27 PROCEDURE — 99213 OFFICE O/P EST LOW 20 MIN: CPT | Performed by: PODIATRIST

## 2023-07-19 ENCOUNTER — OFFICE VISIT (OUTPATIENT)
Dept: FAMILY MEDICINE CLINIC | Facility: CLINIC | Age: 23
End: 2023-07-19

## 2023-07-19 VITALS
DIASTOLIC BLOOD PRESSURE: 80 MMHG | HEART RATE: 69 BPM | WEIGHT: 211 LBS | HEIGHT: 69.5 IN | BODY MASS INDEX: 30.55 KG/M2 | SYSTOLIC BLOOD PRESSURE: 119 MMHG

## 2023-07-19 DIAGNOSIS — M21.6X1 EQUINUS DEFORMITY OF BOTH FEET: ICD-10-CM

## 2023-07-19 DIAGNOSIS — M76.61 ACHILLES TENDINITIS OF BOTH LOWER EXTREMITIES: Primary | ICD-10-CM

## 2023-07-19 DIAGNOSIS — M21.6X2 EQUINUS DEFORMITY OF BOTH FEET: ICD-10-CM

## 2023-07-19 DIAGNOSIS — R11.0 NAUSEA: ICD-10-CM

## 2023-07-19 DIAGNOSIS — T88.7XXA SIDE EFFECT OF MEDICATION: ICD-10-CM

## 2023-07-19 DIAGNOSIS — F84.0 AUTISM SPECTRUM DISORDER: ICD-10-CM

## 2023-07-19 DIAGNOSIS — M76.62 ACHILLES TENDINITIS OF BOTH LOWER EXTREMITIES: Primary | ICD-10-CM

## 2023-07-19 PROCEDURE — 3008F BODY MASS INDEX DOCD: CPT | Performed by: NURSE PRACTITIONER

## 2023-07-19 PROCEDURE — 99214 OFFICE O/P EST MOD 30 MIN: CPT | Performed by: NURSE PRACTITIONER

## 2023-07-19 PROCEDURE — 3079F DIAST BP 80-89 MM HG: CPT | Performed by: NURSE PRACTITIONER

## 2023-07-19 PROCEDURE — 3074F SYST BP LT 130 MM HG: CPT | Performed by: NURSE PRACTITIONER

## 2023-07-19 RX ORDER — PROPRANOLOL HYDROCHLORIDE 10 MG/1
10 TABLET ORAL DAILY
COMMUNITY
Start: 2023-06-26

## 2023-07-19 RX ORDER — NAPROXEN 500 MG/1
500 TABLET ORAL 2 TIMES DAILY WITH MEALS
Qty: 60 TABLET | Refills: 0 | Status: SHIPPED | OUTPATIENT
Start: 2023-07-19

## 2023-07-19 RX ORDER — ONDANSETRON 4 MG/1
4 TABLET, FILM COATED ORAL EVERY 8 HOURS PRN
Qty: 20 TABLET | Refills: 1 | Status: SHIPPED | OUTPATIENT
Start: 2023-07-19

## 2023-07-19 RX ORDER — RIZATRIPTAN BENZOATE 5 MG/1
TABLET ORAL
COMMUNITY
Start: 2023-06-26

## 2023-08-30 DIAGNOSIS — K58.2 IRRITABLE BOWEL SYNDROME WITH BOTH CONSTIPATION AND DIARRHEA: ICD-10-CM

## 2023-08-30 RX ORDER — DICYCLOMINE HYDROCHLORIDE 10 MG/1
10 CAPSULE ORAL 4 TIMES DAILY
Qty: 360 CAPSULE | Refills: 3 | Status: SHIPPED | OUTPATIENT
Start: 2023-08-30

## 2023-10-13 ENCOUNTER — TELEPHONE (OUTPATIENT)
Dept: FAMILY MEDICINE CLINIC | Facility: CLINIC | Age: 23
End: 2023-10-13

## 2023-10-13 ENCOUNTER — HOSPITAL ENCOUNTER (OUTPATIENT)
Age: 23
Discharge: HOME | End: 2023-10-13
Payer: MEDICAID

## 2023-10-13 VITALS
SYSTOLIC BLOOD PRESSURE: 130 MMHG | RESPIRATION RATE: 20 BRPM | HEART RATE: 97 BPM | OXYGEN SATURATION: 98 % | TEMPERATURE: 98 F | DIASTOLIC BLOOD PRESSURE: 90 MMHG

## 2023-10-13 DIAGNOSIS — J02.9 ACUTE PHARYNGITIS, UNSPECIFIED ETIOLOGY: Primary | ICD-10-CM

## 2023-10-13 LAB — S PYO AG THROAT QL: NEGATIVE

## 2023-10-13 NOTE — ED INITIAL ASSESSMENT (HPI)
C/o sore throat for 3 days. Pt reports left ear pain. Pt denies anyone else at home sick. Pt denies recent travel. Pt denies otc meds used for symptoms. Pt denies congestion, cough, n/v, headache.

## 2023-10-13 NOTE — TELEPHONE ENCOUNTER
Patient called for appt for sick/illness, ear pain. No appts today; advised ADO IC. Patient verbalized understanding.

## 2024-02-13 ENCOUNTER — TELEPHONE (OUTPATIENT)
Dept: FAMILY MEDICINE CLINIC | Facility: CLINIC | Age: 24
End: 2024-02-13

## 2024-02-13 ENCOUNTER — OFFICE VISIT (OUTPATIENT)
Dept: FAMILY MEDICINE CLINIC | Facility: CLINIC | Age: 24
End: 2024-02-13
Payer: MEDICAID

## 2024-02-13 VITALS
HEART RATE: 87 BPM | BODY MASS INDEX: 31.64 KG/M2 | RESPIRATION RATE: 18 BRPM | SYSTOLIC BLOOD PRESSURE: 136 MMHG | DIASTOLIC BLOOD PRESSURE: 90 MMHG | WEIGHT: 218.5 LBS | HEIGHT: 69.5 IN

## 2024-02-13 DIAGNOSIS — M76.62 ACHILLES TENDINITIS OF BOTH LOWER EXTREMITIES: ICD-10-CM

## 2024-02-13 DIAGNOSIS — M76.61 ACHILLES TENDINITIS OF BOTH LOWER EXTREMITIES: ICD-10-CM

## 2024-02-13 DIAGNOSIS — R11.0 NAUSEA: ICD-10-CM

## 2024-02-13 DIAGNOSIS — T88.7XXA SIDE EFFECT OF MEDICATION: ICD-10-CM

## 2024-02-13 DIAGNOSIS — E55.9 VITAMIN D DEFICIENCY: ICD-10-CM

## 2024-02-13 DIAGNOSIS — M76.62 ACHILLES TENDINITIS OF BOTH LOWER EXTREMITIES: Primary | ICD-10-CM

## 2024-02-13 DIAGNOSIS — M21.932 ACQUIRED DEFORMITY OF LEFT WRIST: ICD-10-CM

## 2024-02-13 DIAGNOSIS — M21.6X1 EQUINUS DEFORMITY OF BOTH FEET: ICD-10-CM

## 2024-02-13 DIAGNOSIS — M21.931 ACQUIRED DEFORMITY OF RIGHT WRIST: ICD-10-CM

## 2024-02-13 DIAGNOSIS — F84.0 AUTISM SPECTRUM DISORDER: ICD-10-CM

## 2024-02-13 DIAGNOSIS — M76.61 ACHILLES TENDINITIS OF BOTH LOWER EXTREMITIES: Primary | ICD-10-CM

## 2024-02-13 DIAGNOSIS — M21.6X2 EQUINUS DEFORMITY OF BOTH FEET: ICD-10-CM

## 2024-02-13 PROCEDURE — 99213 OFFICE O/P EST LOW 20 MIN: CPT | Performed by: NURSE PRACTITIONER

## 2024-02-13 RX ORDER — DULOXETIN HYDROCHLORIDE 60 MG/1
60 CAPSULE, DELAYED RELEASE ORAL EVERY MORNING
COMMUNITY
Start: 2024-02-04

## 2024-02-13 RX ORDER — QUETIAPINE FUMARATE 100 MG/1
TABLET, FILM COATED ORAL
COMMUNITY
Start: 2023-11-08

## 2024-02-13 RX ORDER — DULOXETIN HYDROCHLORIDE 30 MG/1
30 CAPSULE, DELAYED RELEASE ORAL NIGHTLY
COMMUNITY
Start: 2024-02-04

## 2024-02-13 NOTE — PROGRESS NOTES
HPI    Patient presents for concerns of bilateral leg pain.  With a longstanding history since 2019.  Has achilles tendinitis of bilateral lower extremities and states that the stretches don't really help much.  Last follow up with podiatry in 6/2023.  He is in the process of applying for jobs and would like a letter stating that he can not stand for long periods of time.  Also with concerns since unable to supinate wrists bilaterally.      Review of Systems   Musculoskeletal:         Bilateral foot/ankle pain.     All other systems reviewed and are negative.       Vitals:    02/13/24 1135   BP: 136/90   Pulse: 87   Resp: 18   Weight: 218 lb 8 oz (99.1 kg)   Height: 5' 9.5\" (1.765 m)     Body mass index is 31.8 kg/m².    Health Maintenance   Topic Date Due    HPV Vaccines (1 - Male 2-dose series) Never done    COVID-19 Vaccine (4 - 2023-24 season) 09/01/2023    Influenza Vaccine (1) Never done    Annual Depression Screening  01/01/2024    Annual Physical  05/17/2024    DTaP,Tdap,and Td Vaccines (2 - Td or Tdap) 05/12/2032    Pneumococcal Vaccine: Birth to 64yrs  Aged Out       Past Medical History:   Diagnosis Date    Anxiety     Depression        .History reviewed. No pertinent surgical history.    Family History   Problem Relation Age of Onset    Arthritis Mother     Anxiety Mother     Depression Mother        Social History     Socioeconomic History    Marital status: Single     Spouse name: Not on file    Number of children: Not on file    Years of education: Not on file    Highest education level: Not on file   Occupational History    Not on file   Tobacco Use    Smoking status: Never    Smokeless tobacco: Never   Vaping Use    Vaping Use: Never used   Substance and Sexual Activity    Alcohol use: Never    Drug use: Never    Sexual activity: Not on file   Other Topics Concern    Not on file   Social History Narrative    Not on file     Social Determinants of Health     Financial Resource Strain: Not on file    Food Insecurity: Not on file   Transportation Needs: Not on file   Physical Activity: Not on file   Stress: Not on file   Social Connections: Not on file   Housing Stability: Not on file       Current Outpatient Medications   Medication Sig Dispense Refill    DULoxetine 30 MG Oral Cap DR Particles Take 1 capsule (30 mg total) by mouth nightly.      DULoxetine 60 MG Oral Cap DR Particles Take 1 capsule (60 mg total) by mouth every morning.      QUEtiapine 100 MG Oral Tab       dicyclomine 10 MG Oral Cap Take 1 capsule (10 mg total) by mouth 4 (four) times daily. 360 capsule 3    naproxen 500 MG Oral Tab Take 1 tablet (500 mg total) by mouth 2 (two) times daily with meals. 60 tablet 0    ondansetron (ZOFRAN) 4 mg tablet Take 1 tablet (4 mg total) by mouth every 8 (eight) hours as needed for Nausea. 20 tablet 1    ergocalciferol 1.25 MG (74418 UT) Oral Cap Take 1 capsule (50,000 Units total) by mouth once a week. 12 capsule 3       Allergies:  Allergies   Allergen Reactions    Paroxetine PALPITATIONS       Physical Exam  Vitals and nursing note reviewed.   Constitutional:       General: He is not in acute distress.     Appearance: Normal appearance.   HENT:      Head: Normocephalic and atraumatic.   Pulmonary:      Effort: No respiratory distress.   Neurological:      Mental Status: He is alert and oriented to person, place, and time.          Assessment and Plan:   Problem List Items Addressed This Visit    None  Visit Diagnoses       Achilles tendinitis of both lower extremities    -  Primary    Acquired deformity of left wrist        Acquired deformity of right wrist               Letter generated.      Discussed plan of care with patient and patient is in agreement.  All questions answered. Patient to call with questions or concerns.    Encouraged to sign up for My Chart if not already registered.

## 2024-02-15 RX ORDER — NAPROXEN 500 MG/1
500 TABLET ORAL 2 TIMES DAILY WITH MEALS
Qty: 60 TABLET | Refills: 0 | Status: SHIPPED | OUTPATIENT
Start: 2024-02-15

## 2024-02-15 RX ORDER — ONDANSETRON 4 MG/1
4 TABLET, FILM COATED ORAL EVERY 8 HOURS PRN
Qty: 20 TABLET | Refills: 1 | Status: SHIPPED | OUTPATIENT
Start: 2024-02-15

## 2024-02-15 RX ORDER — ERGOCALCIFEROL 1.25 MG/1
50000 CAPSULE ORAL WEEKLY
Qty: 12 CAPSULE | Refills: 0 | OUTPATIENT
Start: 2024-02-15

## 2024-02-15 NOTE — TELEPHONE ENCOUNTER
Refill passed per [facility] protocol, however, per 7/2023 visit:    Trial of naproxen bid prn. Continue stretches as prescribed from Dr Lewis. Zofran prn for nausea. To follow up with psychiatry as scheduled tomorrow to discussed side effects of medications.   Requested Prescriptions   Pending Prescriptions Disp Refills    naproxen 500 MG Oral Tab 60 tablet 0     Sig: Take 1 tablet (500 mg total) by mouth 2 (two) times daily with meals.       Non-Narcotic Pain Medication Protocol Passed - 2/13/2024 11:33 AM        Passed - In person appointment or virtual visit in the past 6 mos or appointment in next 3 mos     Recent Outpatient Visits              2 days ago Achilles tendinitis of both lower extremities    [facility] Frances Velarde APRN    Office Visit    7 months ago Achilles tendinitis of both lower extremities    [facility] Frances Velarde APRN    Office Visit    7 months ago [facility]illes tendinitis of both lower extremities    [facility]    [facility] Visit    8 months ago Achilles tendinitis of both lower extremities    [facility]    Office Visit    9 months ago Well adult exam    [facility] Frances Velarde APRN    Office Visit                        ondansetron (ZOFRAN) 4 mg tablet 20 tablet 1     Sig: Take 1 tablet (4 mg total) by mouth every 8 (eight) hours as needed for Nausea.       There is no refill protocol information for this order        Recent Outpatient Visits              2 days ago Achilles tendinitis of both lower extremities    [facility], Frances Velarde APRN    Office Visit    7 months ago [facility]illes tendinitis of both lower [facility]     Office Visit    7 months ago Achilles tendinitis of both lower extremities    [place], [address], [place] Artur Lewis, ROSEMARY    Office Visit    8 months ago Achilles tendinitis of both lower extremities    [place] [facility]    Office Visit    9 months ago Well adult exam    [place], [address], Frances Velarde APRN    Office Visit

## 2024-02-15 NOTE — TELEPHONE ENCOUNTER
May switch to otc 2000 international units daily.  Last lab was normal.  Rx vit d no longer required.  Please inform patient.

## 2024-02-15 NOTE — TELEPHONE ENCOUNTER
Please review. Protocol Failed or has No Protocol.    Requested Prescriptions   Pending Prescriptions Disp Refills    naproxen 500 MG Oral Tab 60 tablet 0     Sig: Take 1 tablet (500 mg total) by mouth 2 (two) times daily with meals.       Non-Narcotic Pain Medication Protocol Passed - 2/13/2024 11:33 AM        Passed - In person appointment or virtual visit in the past 6 mos or appointment in next 3 mos     Recent Outpatient Visits              2 days ago [facility]illes tendinitis of both lower extremities    [facility]    Office Visit    7 months ago Achilles tendinitis of both lower extremities    [facility]    Office Visit    7 months ago Achilles tendinitis of both lower extremities    [facility] [place] Meshulam, Eric Hal, DPM    Office Visit    8 months ago Achilles tendinitis of both lower extremities    [facility], [address], [place] Julien Miranda PA-C    Office Visit    9 months ago Well adult exam    [facility] Frances Velarde, APRN    Office Visit                        ondansetron (ZOFRAN) 4 mg tablet 20 tablet 1     Sig: Take 1 tablet (4 mg total) by mouth every 8 (eight) hours as needed for Nausea.       There is no refill protocol information for this order          Recent Outpatient Visits              2 days ago Achilles tendinitis of both lower extremities    [facility] Frances Velarde, APRN    Office Visit    7 months ago Achilles tendinitis of both lower extremities    [facility]    Office Visit    7 months ago Achilles tendinitis of both lower extremities    [facility] [place] Meshulam, Eric Roland, DPM    Office Visit    8 months ago Achilles tendinitis of both  lower extremities    [facility], [facility] [place] Nguyen, Minhxuyen, PA-C    Office Visit    9 months ago Well adult exam    [facility], [address], Frances Velarde APRN    Office Visit

## 2024-02-15 NOTE — TELEPHONE ENCOUNTER
Please review; protocol failed/ has no protocol    Requested Prescriptions   Pending Prescriptions Disp Refills    ergocalciferol 1.25 MG (70231 UT) Oral Cap 12 capsule 3     Sig: Take 1 capsule (50,000 Units total) by mouth once a week.       There is no refill protocol information for this order        Recent Outpatient Visits              2 days ago Achilles tendinitis of both lower extremities    [facility], Frances Velarde APRN    Office Visit    7 months ago Achilles tendinitis of both lower extremities    [facility], Frances Velarde APRN    Office Visit    7 months ago Achilles tendinitis of both lower extremities    [facility], [address], [place] Artur Lewis DPM    Office Visit    8 months ago [facility]illes tendinitis of both lower extremities    [facility]    Office Visit    9 months ago Well adult exam    [facility] [place]    Office Visit

## 2024-05-03 ENCOUNTER — OFFICE VISIT (OUTPATIENT)
Dept: FAMILY MEDICINE CLINIC | Facility: CLINIC | Age: 24
End: 2024-05-03

## 2024-05-03 VITALS
DIASTOLIC BLOOD PRESSURE: 80 MMHG | BODY MASS INDEX: 32.43 KG/M2 | WEIGHT: 224 LBS | HEART RATE: 80 BPM | HEIGHT: 69.5 IN | SYSTOLIC BLOOD PRESSURE: 120 MMHG

## 2024-05-03 DIAGNOSIS — L30.9 DERMATITIS: Primary | ICD-10-CM

## 2024-05-03 PROCEDURE — 99213 OFFICE O/P EST LOW 20 MIN: CPT | Performed by: NURSE PRACTITIONER

## 2024-05-03 RX ORDER — TRIAMCINOLONE ACETONIDE 1 MG/G
CREAM TOPICAL 2 TIMES DAILY PRN
Qty: 60 G | Refills: 1 | Status: SHIPPED | OUTPATIENT
Start: 2024-05-03

## 2024-05-03 NOTE — PROGRESS NOTES
HPI    Patient presents for concerns of bilateral arm redness as well as bilateral upper eyelid redness that comes and goes.    Review of Systems   Skin:  Positive for rash.        Vitals:    05/03/24 1022   BP: (!) 142/93   Pulse: 80   Weight: 224 lb (101.6 kg)   Height: 5' 9.5\" (1.765 m)     Body mass index is 32.6 kg/m².    Health Maintenance   Topic Date Due    HPV Vaccines (1 - Male 3-dose series) Never done    COVID-19 Vaccine (4 - 2023-24 season) 09/01/2023    Annual Physical  05/17/2024    HTN: BP Follow-Up  06/03/2024    Influenza Vaccine (Season Ended) 10/01/2024    DTaP,Tdap,and Td Vaccines (2 - Td or Tdap) 05/12/2032    Annual Depression Screening  Completed    Pneumococcal Vaccine: Birth to 64yrs  Aged Out       Past Medical History:    Anxiety    Depression       .History reviewed. No pertinent surgical history.    Family History   Problem Relation Age of Onset    Arthritis Mother     Anxiety Mother     Depression Mother        Social History     Socioeconomic History    Marital status: Single     Spouse name: Not on file    Number of children: Not on file    Years of education: Not on file    Highest education level: Not on file   Occupational History    Not on file   Tobacco Use    Smoking status: Never    Smokeless tobacco: Never   Vaping Use    Vaping status: Never Used   Substance and Sexual Activity    Alcohol use: Never    Drug use: Never    Sexual activity: Not on file   Other Topics Concern    Not on file   Social History Narrative    Not on file     Social Determinants of Health     Financial Resource Strain: Not on file   Food Insecurity: Not on file   Transportation Needs: Not on file   Physical Activity: Not on file   Stress: Not on file   Social Connections: Not on file   Housing Stability: Not on file       Current Outpatient Medications   Medication Sig Dispense Refill    triamcinolone 0.1 % External Cream Apply topically 2 (two) times daily as needed. 60 g 1    naproxen 500 MG Oral Tab  Take 1 tablet (500 mg total) by mouth 2 (two) times daily with meals. 60 tablet 0    ondansetron (ZOFRAN) 4 mg tablet Take 1 tablet (4 mg total) by mouth every 8 (eight) hours as needed for Nausea. 20 tablet 1    DULoxetine 30 MG Oral Cap DR Particles Take 1 capsule (30 mg total) by mouth nightly.      DULoxetine 60 MG Oral Cap DR Particles Take 1 capsule (60 mg total) by mouth every morning.      QUEtiapine 100 MG Oral Tab       dicyclomine 10 MG Oral Cap Take 1 capsule (10 mg total) by mouth 4 (four) times daily. 360 capsule 3    ergocalciferol 1.25 MG (23096 UT) Oral Cap Take 1 capsule (50,000 Units total) by mouth once a week. (Patient not taking: Reported on 5/3/2024) 12 capsule 3       Allergies:  Allergies   Allergen Reactions    Paroxetine PALPITATIONS       Physical Exam  Vitals and nursing note reviewed.   Constitutional:       General: He is not in acute distress.     Appearance: Normal appearance.   Cardiovascular:      Rate and Rhythm: Normal rate and regular rhythm.      Heart sounds: Normal heart sounds. No murmur heard.  Pulmonary:      Effort: Pulmonary effort is normal. No respiratory distress.      Breath sounds: Normal breath sounds. No stridor. No wheezing, rhonchi or rales.   Chest:      Chest wall: No tenderness.   Skin:     Findings: Rash present.   Neurological:      Mental Status: He is alert and oriented to person, place, and time.          Assessment and Plan:   Problem List Items Addressed This Visit    None  Visit Diagnoses       Dermatitis    -  Primary    Relevant Medications    triamcinolone 0.1 % External Cream           Triamcinolone cream twice daily as needed.  Supportive care discussed.  Follow-up as needed.    Discussed plan of care with patient and patient is in agreement.  All questions answered. Patient to call with questions or concerns.    Encouraged to sign up for My Chart if not already registered.

## 2024-06-28 ENCOUNTER — LAB ENCOUNTER (OUTPATIENT)
Dept: LAB | Age: 24
End: 2024-06-28
Attending: NURSE PRACTITIONER
Payer: MEDICAID

## 2024-06-28 ENCOUNTER — OFFICE VISIT (OUTPATIENT)
Dept: FAMILY MEDICINE CLINIC | Facility: CLINIC | Age: 24
End: 2024-06-28

## 2024-06-28 VITALS
BODY MASS INDEX: 32.14 KG/M2 | DIASTOLIC BLOOD PRESSURE: 87 MMHG | HEART RATE: 101 BPM | WEIGHT: 222 LBS | HEIGHT: 69.5 IN | SYSTOLIC BLOOD PRESSURE: 139 MMHG

## 2024-06-28 DIAGNOSIS — Z00.00 WELL ADULT EXAM: ICD-10-CM

## 2024-06-28 DIAGNOSIS — E78.2 MIXED HYPERLIPIDEMIA: ICD-10-CM

## 2024-06-28 DIAGNOSIS — Z11.3 SCREENING EXAMINATION FOR STD (SEXUALLY TRANSMITTED DISEASE): ICD-10-CM

## 2024-06-28 DIAGNOSIS — Z00.00 WELL ADULT EXAM: Primary | ICD-10-CM

## 2024-06-28 DIAGNOSIS — R73.01 ELEVATED FASTING GLUCOSE: ICD-10-CM

## 2024-06-28 LAB
ALBUMIN SERPL-MCNC: 5.1 G/DL (ref 3.2–4.8)
ALBUMIN/GLOB SERPL: 1.7 {RATIO} (ref 1–2)
ALP LIVER SERPL-CCNC: 92 U/L
ALT SERPL-CCNC: 98 U/L
ANION GAP SERPL CALC-SCNC: 6 MMOL/L (ref 0–18)
AST SERPL-CCNC: 36 U/L (ref ?–34)
BASOPHILS # BLD AUTO: 0.06 X10(3) UL (ref 0–0.2)
BASOPHILS NFR BLD AUTO: 0.8 %
BILIRUB SERPL-MCNC: 0.7 MG/DL (ref 0.3–1.2)
BUN BLD-MCNC: 14 MG/DL (ref 9–23)
BUN/CREAT SERPL: 11.8 (ref 10–20)
CALCIUM BLD-MCNC: 9.8 MG/DL (ref 8.7–10.4)
CHLORIDE SERPL-SCNC: 106 MMOL/L (ref 98–112)
CHOLEST SERPL-MCNC: 239 MG/DL (ref ?–200)
CO2 SERPL-SCNC: 30 MMOL/L (ref 21–32)
CREAT BLD-MCNC: 1.19 MG/DL
DEPRECATED RDW RBC AUTO: 37.7 FL (ref 35.1–46.3)
EGFRCR SERPLBLD CKD-EPI 2021: 87 ML/MIN/1.73M2 (ref 60–?)
EOSINOPHIL # BLD AUTO: 0.62 X10(3) UL (ref 0–0.7)
EOSINOPHIL NFR BLD AUTO: 8 %
ERYTHROCYTE [DISTWIDTH] IN BLOOD BY AUTOMATED COUNT: 11.9 % (ref 11–15)
FASTING PATIENT LIPID ANSWER: YES
FASTING STATUS PATIENT QL REPORTED: YES
GLOBULIN PLAS-MCNC: 3 G/DL (ref 2–3.5)
GLUCOSE BLD-MCNC: 105 MG/DL (ref 70–99)
HCT VFR BLD AUTO: 50.9 %
HDLC SERPL-MCNC: 45 MG/DL (ref 40–59)
HGB BLD-MCNC: 17.9 G/DL
IMM GRANULOCYTES # BLD AUTO: 0.01 X10(3) UL (ref 0–1)
IMM GRANULOCYTES NFR BLD: 0.1 %
LDLC SERPL CALC-MCNC: 167 MG/DL (ref ?–100)
LYMPHOCYTES # BLD AUTO: 2.91 X10(3) UL (ref 1–4)
LYMPHOCYTES NFR BLD AUTO: 37.5 %
MCH RBC QN AUTO: 30.3 PG (ref 26–34)
MCHC RBC AUTO-ENTMCNC: 35.2 G/DL (ref 31–37)
MCV RBC AUTO: 86.1 FL
MONOCYTES # BLD AUTO: 0.46 X10(3) UL (ref 0.1–1)
MONOCYTES NFR BLD AUTO: 5.9 %
NEUTROPHILS # BLD AUTO: 3.71 X10 (3) UL (ref 1.5–7.7)
NEUTROPHILS # BLD AUTO: 3.71 X10(3) UL (ref 1.5–7.7)
NEUTROPHILS NFR BLD AUTO: 47.7 %
NONHDLC SERPL-MCNC: 194 MG/DL (ref ?–130)
OSMOLALITY SERPL CALC.SUM OF ELEC: 295 MOSM/KG (ref 275–295)
PLATELET # BLD AUTO: 295 10(3)UL (ref 150–450)
POTASSIUM SERPL-SCNC: 3.9 MMOL/L (ref 3.5–5.1)
PROT SERPL-MCNC: 8.1 G/DL (ref 5.7–8.2)
RBC # BLD AUTO: 5.91 X10(6)UL
SODIUM SERPL-SCNC: 142 MMOL/L (ref 136–145)
T PALLIDUM AB SER QL IA: NONREACTIVE
TRIGL SERPL-MCNC: 146 MG/DL (ref 30–149)
TSI SER-ACNC: 4.69 MIU/ML (ref 0.55–4.78)
VLDLC SERPL CALC-MCNC: 29 MG/DL (ref 0–30)
WBC # BLD AUTO: 7.8 X10(3) UL (ref 4–11)

## 2024-06-28 PROCEDURE — 87591 N.GONORRHOEAE DNA AMP PROB: CPT

## 2024-06-28 PROCEDURE — 84443 ASSAY THYROID STIM HORMONE: CPT

## 2024-06-28 PROCEDURE — 80053 COMPREHEN METABOLIC PANEL: CPT

## 2024-06-28 PROCEDURE — 86780 TREPONEMA PALLIDUM: CPT

## 2024-06-28 PROCEDURE — 87389 HIV-1 AG W/HIV-1&-2 AB AG IA: CPT

## 2024-06-28 PROCEDURE — 36415 COLL VENOUS BLD VENIPUNCTURE: CPT

## 2024-06-28 PROCEDURE — 99395 PREV VISIT EST AGE 18-39: CPT | Performed by: NURSE PRACTITIONER

## 2024-06-28 PROCEDURE — 80061 LIPID PANEL: CPT

## 2024-06-28 PROCEDURE — 85025 COMPLETE CBC W/AUTO DIFF WBC: CPT

## 2024-06-28 PROCEDURE — 87491 CHLMYD TRACH DNA AMP PROBE: CPT

## 2024-06-28 PROCEDURE — 83036 HEMOGLOBIN GLYCOSYLATED A1C: CPT

## 2024-06-28 RX ORDER — PROPRANOLOL HYDROCHLORIDE 10 MG/1
10 TABLET ORAL 2 TIMES DAILY PRN
COMMUNITY
Start: 2024-06-14

## 2024-06-28 NOTE — PROGRESS NOTES
HPI    Patient presents for annual physical and std screening.      Review of Systems   All other systems reviewed and are negative.       Vitals:    06/28/24 0838   BP: 139/87   Pulse: 101   Weight: 222 lb (100.7 kg)   Height: 5' 9.5\" (1.765 m)     Body mass index is 32.31 kg/m².    Health Maintenance   Topic Date Due    HPV Vaccines (1 - Male 3-dose series) Never done    COVID-19 Vaccine (4 - 2023-24 season) 09/01/2023    Annual Physical  05/17/2024    Influenza Vaccine (Season Ended) 10/01/2024    DTaP,Tdap,and Td Vaccines (2 - Td or Tdap) 05/12/2032    Annual Depression Screening  Completed    Pneumococcal Vaccine: Birth to 64yrs  Aged Out       Past Medical History:    Anxiety    Depression       .History reviewed. No pertinent surgical history.    Family History   Problem Relation Age of Onset    Arthritis Mother     Anxiety Mother     Depression Mother        Social History     Socioeconomic History    Marital status: Single     Spouse name: Not on file    Number of children: Not on file    Years of education: Not on file    Highest education level: Not on file   Occupational History    Not on file   Tobacco Use    Smoking status: Never    Smokeless tobacco: Never   Vaping Use    Vaping status: Never Used   Substance and Sexual Activity    Alcohol use: Never    Drug use: Never    Sexual activity: Not on file   Other Topics Concern    Not on file   Social History Narrative    Not on file     Social Determinants of Health     Financial Resource Strain: Not on file   Food Insecurity: Not on file   Transportation Needs: Not on file   Physical Activity: Not on file   Stress: Not on file   Social Connections: Not on file   Housing Stability: Not on file       Current Outpatient Medications   Medication Sig Dispense Refill    propranolol 10 MG Oral Tab Take 1 tablet (10 mg total) by mouth 2 (two) times daily as needed.      triamcinolone 0.1 % External Cream Apply topically 2 (two) times daily as needed. 60 g 1     naproxen 500 MG Oral Tab Take 1 tablet (500 mg total) by mouth 2 (two) times daily with meals. 60 tablet 0    DULoxetine 60 MG Oral Cap DR Particles Take 1 capsule (60 mg total) by mouth every morning.      QUEtiapine 100 MG Oral Tab       dicyclomine 10 MG Oral Cap Take 1 capsule (10 mg total) by mouth 4 (four) times daily. 360 capsule 3    ondansetron (ZOFRAN) 4 mg tablet Take 1 tablet (4 mg total) by mouth every 8 (eight) hours as needed for Nausea. (Patient not taking: Reported on 6/28/2024) 20 tablet 1    DULoxetine 30 MG Oral Cap DR Particles Take 1 capsule (30 mg total) by mouth nightly. (Patient not taking: Reported on 6/28/2024)      ergocalciferol 1.25 MG (40715 UT) Oral Cap Take 1 capsule (50,000 Units total) by mouth once a week. (Patient not taking: Reported on 5/3/2024) 12 capsule 3       Allergies:  Allergies   Allergen Reactions    Paroxetine PALPITATIONS       Physical Exam  Vitals and nursing note reviewed.   Constitutional:       General: He is not in acute distress.     Appearance: Normal appearance. He is well-developed. He is not ill-appearing, toxic-appearing or diaphoretic.   HENT:      Head: Normocephalic and atraumatic.      Right Ear: Hearing, tympanic membrane, ear canal and external ear normal. There is no impacted cerumen.      Left Ear: Hearing, tympanic membrane, ear canal and external ear normal. There is no impacted cerumen.      Nose: Nose normal. No congestion.      Mouth/Throat:      Mouth: Mucous membranes are moist.      Pharynx: Oropharynx is clear. No oropharyngeal exudate or posterior oropharyngeal erythema.   Eyes:      General:         Right eye: No discharge.         Left eye: No discharge.      Conjunctiva/sclera: Conjunctivae normal.   Neck:      Thyroid: No thyromegaly.   Cardiovascular:      Rate and Rhythm: Normal rate and regular rhythm.      Pulses: Normal pulses.      Heart sounds: Normal heart sounds. No murmur heard.  Pulmonary:      Effort: Pulmonary effort is  normal. No respiratory distress.      Breath sounds: Normal breath sounds. No stridor. No wheezing, rhonchi or rales.   Chest:      Chest wall: No tenderness.   Abdominal:      General: Abdomen is flat. Bowel sounds are normal. There is no distension.      Palpations: Abdomen is soft. There is no mass.      Tenderness: There is no abdominal tenderness. There is no guarding or rebound.      Hernia: No hernia is present.   Musculoskeletal:         General: Normal range of motion.      Cervical back: Normal range of motion and neck supple.   Lymphadenopathy:      Cervical: No cervical adenopathy.   Skin:     General: Skin is warm and dry.   Neurological:      Mental Status: He is alert and oriented to person, place, and time.   Psychiatric:         Mood and Affect: Mood normal.         Behavior: Behavior normal.         Thought Content: Thought content normal.         Judgment: Judgment normal.          Assessment and Plan:   Problem List Items Addressed This Visit    None  Visit Diagnoses       Well adult exam    -  Primary    Relevant Orders    Lipid Panel    Comp Metabolic Panel (14)    CBC With Differential With Platelet    TSH W Reflex To Free T4    Chlamydia/Gc Amplification    HIV Ag/Ab Combo    T Pallidum Screening [place]    Screening examination for STD (sexually transmitted disease)        Relevant Orders    Chlamydia/Gc Amplification    HIV Ag/Ab Combo    T Pallidum Screening [place]           Fasting labs today.      Discussed plan of care with patient and patient is in agreement.  All questions answered. Patient to call with questions or concerns.    Encouraged to sign up for My Chart if not already registered.

## 2024-06-30 DIAGNOSIS — Z00.00 WELL ADULT EXAM: Primary | ICD-10-CM

## 2024-06-30 DIAGNOSIS — E78.2 MIXED HYPERLIPIDEMIA: ICD-10-CM

## 2024-06-30 DIAGNOSIS — R73.01 ELEVATED FASTING GLUCOSE: ICD-10-CM

## 2024-06-30 LAB
EST. AVERAGE GLUCOSE BLD GHB EST-MCNC: 103 MG/DL (ref 68–126)
HBA1C MFR BLD: 5.2 % (ref ?–5.7)

## 2024-07-01 LAB
C TRACH DNA SPEC QL NAA+PROBE: NEGATIVE
N GONORRHOEA DNA SPEC QL NAA+PROBE: NEGATIVE

## 2024-09-22 DIAGNOSIS — M21.6X1 EQUINUS DEFORMITY OF BOTH FEET: ICD-10-CM

## 2024-09-22 DIAGNOSIS — M76.62 ACHILLES TENDINITIS OF BOTH LOWER EXTREMITIES: ICD-10-CM

## 2024-09-22 DIAGNOSIS — M76.61 ACHILLES TENDINITIS OF BOTH LOWER EXTREMITIES: ICD-10-CM

## 2024-09-22 DIAGNOSIS — M21.6X2 EQUINUS DEFORMITY OF BOTH FEET: ICD-10-CM

## 2024-09-27 RX ORDER — NAPROXEN 500 MG/1
500 TABLET ORAL 2 TIMES DAILY WITH MEALS
Qty: 60 TABLET | Refills: 2 | Status: SHIPPED | OUTPATIENT
Start: 2024-09-27

## 2024-09-27 NOTE — TELEPHONE ENCOUNTER
Refill passed per [facility] protocol.     Requested Prescriptions   Pending Prescriptions Disp Refills    NAPROXEN 500 MG Oral Tab [Pharmacy Med Name: NAPROXEN 500MG TABLETS] 60 tablet 0     Sig: TAKE 1 TABLET(500 MG) BY MOUTH TWICE DAILY WITH MEALS       Non-Narcotic Pain Medication Protocol Passed - 9/22/2024 11:55 AM        Passed - In person appointment or virtual visit in the past 6 mos or appointment in next 3 mos     Recent Outpatient Visits              [facility]    [facility]    4 months ago [facility]    Office Visit    7 months ago Achilles tendinitis of both lower extremities    [facility]    Office Visit    1 year ago [facility]illes tendinitis of both lower extremities    [facility]    Office Visit    1 year ago Achilles tendinitis of both lower extremities    [facility], [place], Eric Hal, DPM    Office Visit

## 2024-11-09 ENCOUNTER — HOSPITAL ENCOUNTER (OUTPATIENT)
Age: 24
Discharge: HOME | End: 2024-11-09
Payer: MEDICAID

## 2024-11-09 VITALS
SYSTOLIC BLOOD PRESSURE: 145 MMHG | HEART RATE: 99 BPM | DIASTOLIC BLOOD PRESSURE: 90 MMHG | RESPIRATION RATE: 16 BRPM | OXYGEN SATURATION: 100 % | TEMPERATURE: 97 F

## 2024-11-09 DIAGNOSIS — R09.89 RUNNY NOSE: Primary | ICD-10-CM

## 2024-11-09 DIAGNOSIS — H66.001 NON-RECURRENT ACUTE SUPPURATIVE OTITIS MEDIA OF RIGHT EAR WITHOUT SPONTANEOUS RUPTURE OF TYMPANIC MEMBRANE: ICD-10-CM

## 2024-11-09 DIAGNOSIS — J06.9 VIRAL URI WITH COUGH: ICD-10-CM

## 2024-11-09 LAB — SARS-COV-2 RNA RESP QL NAA+PROBE: NOT DETECTED

## 2024-11-09 NOTE — ED PROVIDER NOTES
Patient Seen in: [facility]      History     Chief Complaint   Patient presents with    Cough     Stated Complaint: Congestion  Subjective:   24-year-old male with anxiety and depression presents from home.  Patient states that he started with allergy symptoms about 4 days ago.  2 days ago he started to have sore throat with ear pain, stuffy nose, runny nose.  Some nausea, no vomiting.  No fever.  Having postnasal drip.  No significant cough.  No shortness of breath.  No COVID testing done at home.  He has been using cough drops a nasal spray and DayQuil with minimal relief of symptoms.  He is a non-smoker.    The history is provided by the patient. No  was used.     Objective:   Past Medical History:    Anxiety    Depression            History reviewed. No pertinent surgical history.           Social History     Socioeconomic History    Marital status: Single   Tobacco Use    Smoking status: Never    Smokeless tobacco: Never   Vaping Use    Vaping status: Never Used   Substance and Sexual Activity    Alcohol use: Never    Drug use: Never            Review of Systems    Positive for stated complaint: Cough    Other systems are as noted in HPI.  Constitutional and vital signs reviewed.      All other systems reviewed and negative except as noted above.    Physical Exam     ED Triage Vitals [11/09/24 0900]   /90   Pulse 99   Resp 16   Temp 97 °F (36.1 °C)   Temp src Temporal   SpO2 100 %   O2 Device None (Room air)     Current:/90   Pulse 99   Temp 97 °F (36.1 °C) (Temporal)   Resp 16   SpO2 100%     Physical Exam  Vitals and nursing note reviewed.   Constitutional:       General: He is not in acute distress.     Appearance: Normal appearance. He is not ill-appearing or toxic-appearing.   HENT:      Head: Normocephalic and atraumatic.      Right Ear: Ear canal and external ear normal. Tympanic membrane is erythematous and bulging.      Left Ear: Tympanic membrane, ear  canal and external ear normal.      Nose: Congestion present.      Mouth/Throat:      Mouth: Mucous membranes are moist.      Pharynx: Oropharynx is clear. No pharyngeal swelling or posterior oropharyngeal erythema.      Tonsils: No tonsillar exudate.   Eyes:      Pupils: Pupils are equal, round, and reactive to light.   Cardiovascular:      Rate and Rhythm: Normal rate and regular rhythm.      Pulses: Normal pulses.   Pulmonary:      Effort: Pulmonary effort is normal. No respiratory distress.      Breath sounds: Normal breath sounds.      Comments: Lungs clear.  No adventitious lung sounds.  No distress.  No hypoxia.  Pulse ox 100% ra. Which is normal    Abdominal:      General: Abdomen is flat.      Palpations: Abdomen is soft.   Musculoskeletal:         General: No signs of injury. Normal range of motion.      Cervical back: Normal range of motion and neck supple.   Lymphadenopathy:      Cervical: No cervical adenopathy.   Skin:     General: Skin is warm and dry.      Capillary Refill: Capillary refill takes less than 2 seconds.   Neurological:      General: No focal deficit present.      Mental Status: He is alert and oriented to person, place, and time.      GCS: GCS eye subscore is 4. GCS verbal subscore is 5. GCS motor subscore is 6.   Psychiatric:         Mood and Affect: Mood normal.         Behavior: Behavior normal.         Thought Content: Thought content normal.         Judgment: Judgment normal.         ED Course   Radiology:  No results found.  Labs Reviewed   RAPID SARS-COV-2 BY PCR - Normal     Recent Results (from the past 24 hours)   Rapid SARS-CoV-2 by PCR    Collection Time: 11/09/24  9:11 AM    Specimen: Nares; Other   Result Value Ref Range    Rapid SARS-CoV-2 by PCR Not Detected Not Detected     MDM     Medical Decision Making  Differential diagnoses reflecting the complexity of care include: COVID, otitis media, viral URI  COVID test is negative  Right TM is erythematous and bulging  consistent with otitis media.  No TM rupture.  Left TM is unremarkable.  Not consistent with bacterial sinusitis or pneumonia   most likely viral illness that subsequently led to otitis media    Plan of Care: Augmentin.  Continue OTC remedies.  Push fluids.  Follow-up with primary doctor if no improvement    Results and plan of care discussed with the patient/family. They are in agreement with discharge. They understand to follow up with their primary doctor or the referral physician for further evaluation, especially if no improvement.  Also discussed the limitations of immediate care, patient is aware that if symptoms are worse they should go to the emergency room. Verbal and written discharge instructions were given.     My independent interpretation of studies of: COVID-negative  Diagnostic tests and medications considered but not ordered were: Strep, flu  Shared decision making was done by: Patient agreeable to COVID test  Comorbidities that add complexity to management include: Anxiety and depression  External chart review was done and was noted: Reviewed, routine primary care, last antibiotics 5/23 for cat bite  History obtained by an independent source was from: N/A  Discussions and management was done with: N/A  Social determinants of health that affect care: N/A          Problems Addressed:  Non-recurrent acute suppurative otitis media of right ear without spontaneous rupture of tympanic membrane: acute illness or injury  Runny nose: acute illness or injury  Viral URI with cough: acute illness or injury    Amount and/or Complexity of Data Reviewed  Labs: ordered. Decision-making details documented in ED Course.    Risk  OTC drugs.  Prescription drug management.        Disposition and Plan     Clinical Impression:  1. Runny nose    2. Non-recurrent acute suppurative otitis media of right ear without spontaneous rupture of tympanic membrane    3. Viral URI with cough          Disposition:  Discharge  11/9/2024  9:32 am    Follow-up:  Xiao Morocho MD  [address] [place] [id number]  [phone]                Medications Prescribed:  Current Discharge Medication List        START taking these medications    Details   amoxicillin clavulanate 875-125 MG Oral Tab Take 1 tablet by mouth 2 (two) times daily for 10 days.  Qty: 20 tablet, Refills: 0

## 2024-11-09 NOTE — DISCHARGE INSTRUCTIONS
COVID test is negative.  You have a virus that has subsequently led to a right ear infection.  Take Augmentin twice a day.  Continue your over-the-counter remedies.  Drink plenty of fluids.  Schedule follow-up with your primary doctor if no improvement

## 2024-12-08 DIAGNOSIS — K58.2 IRRITABLE BOWEL SYNDROME WITH BOTH CONSTIPATION AND DIARRHEA: ICD-10-CM

## 2024-12-12 RX ORDER — DICYCLOMINE HYDROCHLORIDE 10 MG/1
10 CAPSULE ORAL 4 TIMES DAILY
Qty: 360 CAPSULE | Refills: 3 | Status: SHIPPED | OUTPATIENT
Start: 2024-12-12

## 2024-12-12 NOTE — TELEPHONE ENCOUNTER
Refill passed per [facility] protocols.    Requested Prescriptions   Pending Prescriptions Disp Refills    DICYCLOMINE 10 MG Oral Cap [Pharmacy Med Name: DICYCLOMINE 10MG CAPSULES] 360 capsule 3     Sig: TAKE 1 CAPSULE(10 MG) BY MOUTH FOUR TIMES DAILY       Gastrointestional Medication Protocol Passed - 12/12/2024  1:32 PM        Passed - In person appointment or virtual visit in the past 12 mos or appointment in next 3 mos     Recent Outpatient Visits              5 months ago Well adult exam    [facility], [address], Frances Velarde APRN    Office Visit    7 months [facility]    [facility]    10 months ago Achilles tendinitis of both lower extremities    [facility]    Office Visit    1 year ago Achilles tendinitis of both lower extremities    [facility], [address], Frances Velarde APRN    Office Visit    1 year ago Achilles tendinitis of both lower extremities    [facility], [place], Eric Hal, DPM    Office Visit

## 2024-12-16 DIAGNOSIS — K58.2 IRRITABLE BOWEL SYNDROME WITH BOTH CONSTIPATION AND DIARRHEA: ICD-10-CM

## 2024-12-19 RX ORDER — DICYCLOMINE HYDROCHLORIDE 10 MG/1
10 CAPSULE ORAL 4 TIMES DAILY
Qty: 360 CAPSULE | Refills: 3 | OUTPATIENT
Start: 2024-12-19

## 2024-12-27 DIAGNOSIS — F84.0 AUTISM SPECTRUM DISORDER (HCC): ICD-10-CM

## 2024-12-27 DIAGNOSIS — T88.7XXA SIDE EFFECT OF MEDICATION: ICD-10-CM

## 2024-12-27 DIAGNOSIS — R11.0 NAUSEA: ICD-10-CM

## 2025-01-02 RX ORDER — ONDANSETRON 4 MG/1
4 TABLET, FILM COATED ORAL EVERY 8 HOURS PRN
Qty: 20 TABLET | Refills: 1 | Status: SHIPPED | OUTPATIENT
Start: 2025-01-02

## 2025-01-02 NOTE — TELEPHONE ENCOUNTER
Protocol Failed/ No Protocol    Requested Prescriptions   Pending Prescriptions Disp Refills    ondansetron (ZOFRAN) 4 mg tablet [Pharmacy Med Name: ONDANSETRON 4MG TABLETS] 20 tablet 1     Sig: TAKE 1 TABLET(4 MG) BY MOUTH EVERY 8 HOURS AS NEEDED FOR NAUSEA       There is no refill protocol information for this order            Recent Outpatient Visits              6 months ago Well adult exam    [facility], [address], Frances Velarde APRN    Office Visit    8 months ago Dermatitis    [facility] [place] Frances Velarde APRN    Office Visit    10 months ago Achilles tendinitis of both lower extremities    [facility] [place] Frances Velarde APRN    Office Visit    1 year ago Achilles tendinitis of both lower extremities    [facility], [address], Frances Velarde APRN    Office Visit    1 year ago Achilles tendinitis of both lower extremities    [facility] [place], Eric Hal, DPM    Office Visit

## 2025-05-16 DIAGNOSIS — K58.2 IRRITABLE BOWEL SYNDROME WITH BOTH CONSTIPATION AND DIARRHEA: ICD-10-CM

## 2025-05-19 RX ORDER — DICYCLOMINE HYDROCHLORIDE 10 MG/1
10 CAPSULE ORAL 4 TIMES DAILY
Qty: 360 CAPSULE | Refills: 3 | OUTPATIENT
Start: 2025-05-19

## 2025-05-19 NOTE — TELEPHONE ENCOUNTER
Disp Refills Start End    dicyclomine 10 MG Oral Cap 360 capsule 3 12/12/2024 --    Sig - Route: Take 1 capsule (10 mg total) by mouth 4 (four) times daily. - Oral    Sent to pharmacy as: Dicyclomine HCl 10 MG Oral Capsule (Bentyl)    E-Prescribing Status: Receipt confirmed by pharmacy (12/12/2024  1:32 PM CST)      Associated Diagnoses    Irritable bowel syndrome with both constipation and diarrhea        Pharmacy    [id number] [address] AT [facility], [phone], [phone]

## 2025-05-21 DIAGNOSIS — M76.62 ACHILLES TENDINITIS OF BOTH LOWER EXTREMITIES: ICD-10-CM

## 2025-05-21 DIAGNOSIS — T88.7XXA SIDE EFFECT OF MEDICATION: ICD-10-CM

## 2025-05-21 DIAGNOSIS — K58.2 IRRITABLE BOWEL SYNDROME WITH BOTH CONSTIPATION AND DIARRHEA: ICD-10-CM

## 2025-05-21 DIAGNOSIS — F84.0 AUTISM SPECTRUM DISORDER (HCC): ICD-10-CM

## 2025-05-21 DIAGNOSIS — R11.0 NAUSEA: ICD-10-CM

## 2025-05-21 DIAGNOSIS — M21.6X2 EQUINUS DEFORMITY OF BOTH FEET: ICD-10-CM

## 2025-05-21 DIAGNOSIS — M21.6X1 EQUINUS DEFORMITY OF BOTH FEET: ICD-10-CM

## 2025-05-21 DIAGNOSIS — M76.61 ACHILLES TENDINITIS OF BOTH LOWER EXTREMITIES: ICD-10-CM

## 2025-05-23 DIAGNOSIS — K58.2 IRRITABLE BOWEL SYNDROME WITH BOTH CONSTIPATION AND DIARRHEA: ICD-10-CM

## 2025-05-23 RX ORDER — DICYCLOMINE HYDROCHLORIDE 10 MG/1
10 CAPSULE ORAL 4 TIMES DAILY
Qty: 360 CAPSULE | Refills: 3 | OUTPATIENT
Start: 2025-05-23

## 2025-05-23 RX ORDER — NAPROXEN 500 MG/1
500 TABLET ORAL 2 TIMES DAILY WITH MEALS
Qty: 60 TABLET | Refills: 2 | Status: SHIPPED | OUTPATIENT
Start: 2025-05-23

## 2025-05-23 NOTE — TELEPHONE ENCOUNTER
Refill passed per Clinic protocol.  Requested Prescriptions   Pending Prescriptions Disp Refills    naproxen 500 MG Oral Tab 60 tablet 2     Sig: Take 1 tablet (500 mg total) by mouth 2 (two) times daily with meals.       Non-Narcotic Pain Medication Protocol Passed - 5/23/2025  8:31 AM        Passed - In person appointment or virtual visit in the past 6 mos or appointment in next 3 mos     Recent Outpatient Visits              4 months ago Autism spectrum disorder (HCC)    [facility], [address], Frances Velarde APRN    Office Visit    10 months ago Well adult exam    [facility], [address], Frances Velarde APRN    Office Visit    1 year ago Dermatitis    [facility] [address], [place]    Office Visit    1 year ago Achilles tendinitis of both lower extremities    [facility], [address], Frances Velarde APRN    Office Visit    1 year ago Achilles tendinitis of both lower extremities    [facility] [place] [facility]    Office Visit          Future Appointments         Provider Department Appt Notes    In 1 month Frances Arceo APRN [facility]                    Passed - Medication is active on med list          ondansetron (ZOFRAN) 4 mg tablet 20 tablet 1     Sig: Take 1 tablet (4 mg total) by mouth every 8 (eight) hours as needed for Nausea.       There is no refill protocol information for this order      Refused Prescriptions Disp Refills    dicyclomine 10 MG Oral Cap 360 capsule 3     Sig: Take 1 capsule (10 mg total) by mouth 4 (four) times daily.       Gastrointestional Medication Protocol Passed - 5/23/2025  8:31 AM        Passed - In person appointment or virtual visit in the past 12 mos or appointment in next 3 mos     Recent Outpatient Visits              4 months ago Autism  spectrum disorder (HCC)    [facility] [place] [address]    Office Visit    10 months ago Well adult exam    [facility], [address], Frances Velarde APRN    Office Visit    1 year ago Dermatitis    [facility], [address], Frances Velarde APRN    Office Visit    1 year ago Achilles tendinitis of both lower extremities    [facility] [address]    Office Visit    1 year ago Achilles tendinitis of both lower extremities    [facility], [address], Frances Velarde APRN    Office Visit          Future Appointments         Provider Department Appt Notes    In 1 month Frances Arceo APRN [facility] [address], Russ MRI/ultrasound Ankle and Heel Pain                    Passed - Medication is active on med list

## 2025-05-24 RX ORDER — ONDANSETRON 4 MG/1
4 TABLET, FILM COATED ORAL EVERY 8 HOURS PRN
Qty: 20 TABLET | Refills: 1 | Status: SHIPPED | OUTPATIENT
Start: 2025-05-24

## 2025-05-27 RX ORDER — DICYCLOMINE HYDROCHLORIDE 10 MG/1
10 CAPSULE ORAL 4 TIMES DAILY
Qty: 360 CAPSULE | Refills: 3 | OUTPATIENT
Start: 2025-05-27

## 2025-05-27 NOTE — TELEPHONE ENCOUNTER
dicyclomine 10 MG Oral Cap 360 capsule 3 12/12/2024 --    Sig - Route: Take 1 capsule (10 mg total) by mouth 4 (four) times daily. - Oral    Sent to pharmacy as: Dicyclomine HCl 10 MG Oral Capsule (Bentyl)    E-Prescribing Status: Receipt confirmed by pharmacy (12/12/2024  1:32 PM CST)      Associated Diagnoses    Irritable bowel syndrome with both constipation and diarrhea        Pharmacy    [id number], [place] - [address] AT [facility], [phone], [phone]

## 2025-06-01 ENCOUNTER — OFFICE VISIT (OUTPATIENT)
Dept: FAMILY MEDICINE CLINIC | Facility: CLINIC | Age: 25
End: 2025-06-01
Payer: MEDICAID

## 2025-06-01 VITALS
DIASTOLIC BLOOD PRESSURE: 83 MMHG | BODY MASS INDEX: 32.81 KG/M2 | SYSTOLIC BLOOD PRESSURE: 133 MMHG | WEIGHT: 226.63 LBS | HEIGHT: 69.5 IN | HEART RATE: 71 BPM

## 2025-06-01 DIAGNOSIS — K58.2 IRRITABLE BOWEL SYNDROME WITH BOTH CONSTIPATION AND DIARRHEA: Primary | ICD-10-CM

## 2025-06-01 PROCEDURE — 99213 OFFICE O/P EST LOW 20 MIN: CPT | Performed by: NURSE PRACTITIONER

## 2025-06-01 RX ORDER — DICYCLOMINE HYDROCHLORIDE 10 MG/1
10 CAPSULE ORAL 4 TIMES DAILY
Qty: 360 CAPSULE | Refills: 3 | Status: SHIPPED | OUTPATIENT
Start: 2025-06-01

## 2025-06-01 NOTE — PROGRESS NOTES
HPI    Patient presents for follow up for IBS.  Needs refill on bentyl.  Patient reports that he put in a refill request and that he received a message back saying that it was denied.  Upon further research, there is a message stating that he should have refills in the pharmacy.  I did send in a 1 year supply in December.  Patient reports that the pharmacy told him he needed to follow-up with his provider for refills.    Review of Systems   All other systems reviewed and are negative.       Vitals:    06/01/25 1322   BP: 133/83   Pulse: 71   Weight: 226 lb 9.6 oz (102.8 kg)   Height: 5' 9.5\" (1.765 m)     Body mass index is 32.98 kg/m².    Health Maintenance   Topic Date Due    HPV Vaccines (1 - Male 3-dose series) Never done    COVID-19 Vaccine (4 - 2024-25 season) 09/01/2024    Annual Physical  06/28/2025    Influenza Vaccine (Season Ended) 10/01/2025    DTaP,Tdap,and Td Vaccines (2 - Td or Tdap) 05/12/2032    Annual Depression Screening  Completed    Pneumococcal Vaccine: Birth to 50yrs  Aged Out    Meningococcal B Vaccine  Aged Out       Past Medical History[1]    .Past Surgical History[2]    Family History[3]    Social History     Socioeconomic History    Marital status: Single     Spouse name: Not on file    Number of children: Not on file    Years of education: Not on file    Highest education level: Not on file   Occupational History    Not on file   Tobacco Use    Smoking status: Never    Smokeless tobacco: Never   Vaping Use    Vaping status: Never Used   Substance and Sexual Activity    Alcohol use: Never    Drug use: Never    Sexual activity: Not on file   Other Topics Concern    Not on file   Social History Narrative    Not on file     Social Drivers of Health     Food Insecurity: Not on file   Transportation Needs: Not on file   Stress: Not on file   Housing Stability: Not on file       Current Medications[4]    Allergies:  Allergies[5]    Physical Exam  Vitals and nursing note reviewed.    Constitutional:       General: He is not in acute distress.     Appearance: Normal appearance.   Cardiovascular:      Rate and Rhythm: Normal rate and regular rhythm.      Heart sounds: Normal heart sounds.   Pulmonary:      Effort: Pulmonary effort is normal. No respiratory distress.      Breath sounds: Normal breath sounds. No stridor. No wheezing, rhonchi or rales.   Chest:      Chest wall: No tenderness.   Neurological:      Mental Status: He is alert and oriented to person, place, and time.          Assessment and Plan:   Problem List Items Addressed This Visit          Gastrointestinal and Abdominal    Irritable bowel syndrome with both constipation and diarrhea - Primary    Relevant Medications    dicyclomine 10 MG Oral Cap      Unsure why he is unable to obtain refills in the pharmacy.  Will send in a new prescription to ensure that he will have his medication on hand.  Follow-up in July as scheduled for physical.    Discussed plan of care with patient and patient is in agreement.  All questions answered. Patient to call with questions or concerns.    Encouraged to sign up for My Chart if not already registered.        [1]   Past Medical History:   Allergic rhinitis    Anxiety    Depression    Irritable bowel syndrome   [2] History reviewed. No pertinent surgical history.  [3]   Family History  Problem Relation Age of Onset    Arthritis Mother     Anxiety Mother     Depression Mother    [4]   Current Outpatient Medications   Medication Sig Dispense Refill    dicyclomine 10 MG Oral Cap Take 1 capsule (10 mg total) by mouth 4 (four) times daily. 360 capsule 3    ondansetron (ZOFRAN) 4 mg tablet Take 1 tablet (4 mg total) by mouth every 8 (eight) hours as needed for Nausea. 20 tablet 1    naproxen 500 MG Oral Tab Take 1 tablet (500 mg total) by mouth 2 (two) times daily with meals. 60 tablet 2    triamcinolone 0.1 % External Cream Apply topically 2 (two) times daily as needed. 60 g 1    QUEtiapine 100 MG Oral  Tab  (Patient taking differently: 0.5 tablets (50 mg total).)     [5]   Allergies  Allergen Reactions    Paroxetine PALPITATIONS

## 2025-07-01 ENCOUNTER — OFFICE VISIT (OUTPATIENT)
Dept: FAMILY MEDICINE CLINIC | Facility: CLINIC | Age: 25
End: 2025-07-01
Payer: MEDICAID

## 2025-07-01 VITALS
DIASTOLIC BLOOD PRESSURE: 84 MMHG | HEART RATE: 63 BPM | BODY MASS INDEX: 32.2 KG/M2 | SYSTOLIC BLOOD PRESSURE: 116 MMHG | WEIGHT: 222.38 LBS | HEIGHT: 69.5 IN

## 2025-07-01 DIAGNOSIS — Z00.00 WELL ADULT EXAM: Primary | ICD-10-CM

## 2025-07-01 PROCEDURE — 99395 PREV VISIT EST AGE 18-39: CPT | Performed by: NURSE PRACTITIONER

## 2025-07-01 NOTE — PROGRESS NOTES
HPI    Patient presents for annual physical.  Saw psychiatry yesterday.  Will be having medications changed.  Will send list via my chart.      Diet - diet is fair.  Admits to overeating a little bit.    Exercise - a little more active.    Review of Systems   All other systems reviewed and are negative.       Vitals:    07/01/25 1506   BP: 136/90   Pulse: 63   Weight: 222 lb 6.4 oz (100.9 kg)   Height: 5' 9.5\" (1.765 m)     Body mass index is 32.37 kg/m².    Health Maintenance   Topic Date Due    HPV Vaccines (1 - Male 3-dose series) Never done    COVID-19 Vaccine (4 - 2024-25 season) 09/01/2024    Annual Physical  06/28/2025    Influenza Vaccine (1) 10/01/2025    DTaP,Tdap,and Td Vaccines (2 - Td or Tdap) 05/12/2032    Annual Depression Screening  Completed    Pneumococcal Vaccine: Birth to 50yrs  Aged Out    Meningococcal B Vaccine  Aged Out       Past Medical History[1]    .Past Surgical History[2]    Family History[3]    Social History     Socioeconomic History    Marital status: Single     Spouse name: Not on file    Number of children: Not on file    Years of education: Not on file    Highest education level: Not on file   Occupational History    Not on file   Tobacco Use    Smoking status: Never    Smokeless tobacco: Never   Vaping Use    Vaping status: Never Used   Substance and Sexual Activity    Alcohol use: Never    Drug use: Never    Sexual activity: Not on file   Other Topics Concern    Not on file   Social History Narrative    Not on file     Social Drivers of Health     Food Insecurity: Not on file   Transportation Needs: Not on file   Stress: Not on file   Housing Stability: Not on file       Current Medications[4]    Allergies:  Allergies[5]    Physical Exam  Vitals and nursing note reviewed.   Constitutional:       General: He is not in acute distress.     Appearance: Normal appearance. He is not ill-appearing.   HENT:      Head: Normocephalic and atraumatic.      Right Ear: Tympanic membrane, ear  canal and external ear normal. There is no impacted cerumen.      Left Ear: Tympanic membrane, ear canal and external ear normal. There is no impacted cerumen.      Nose: Nose normal. No congestion or rhinorrhea.      Mouth/Throat:      Mouth: Mucous membranes are moist.      Pharynx: Oropharynx is clear. No oropharyngeal exudate or posterior oropharyngeal erythema.   Eyes:      General:         Right eye: No discharge.         Left eye: No discharge.   Cardiovascular:      Rate and Rhythm: Normal rate and regular rhythm.      Pulses: Normal pulses.      Heart sounds: Normal heart sounds. No murmur heard.  Pulmonary:      Effort: Pulmonary effort is normal. No respiratory distress.      Breath sounds: Normal breath sounds. No stridor. No wheezing, rhonchi or rales.   Chest:      Chest wall: No tenderness.   Abdominal:      General: Abdomen is flat. Bowel sounds are normal. There is no distension.      Palpations: Abdomen is soft. There is no mass.      Tenderness: There is no abdominal tenderness. There is no guarding or rebound.      Hernia: No hernia is present.   Musculoskeletal:         General: Normal range of motion.      Cervical back: Normal range of motion and neck supple. No tenderness.   Lymphadenopathy:      Cervical: No cervical adenopathy.   Skin:     General: Skin is warm and dry.   Neurological:      Mental Status: He is alert and oriented to person, place, and time.   Psychiatric:         Mood and Affect: Mood normal.         Behavior: Behavior normal.          Assessment and Plan:   Problem List Items Addressed This Visit    None  Visit Diagnoses         Well adult exam    -  Primary    Relevant Orders    CBC With Differential With Platelet    Comp Metabolic Panel (14)    Lipid Panel    TSH W Reflex To Free T4           Follow up for fasting labs.      Discussed plan of care with patient and patient is in agreement.  All questions answered. Patient to call with questions or concerns.    Encouraged  to sign up for My Chart if not already registered.        [1]   Past Medical History:   Allergic rhinitis    Anxiety    Depression    Irritable bowel syndrome   [2] History reviewed. No pertinent surgical history.  [3]   Family History  Problem Relation Age of Onset    Arthritis Mother     Anxiety Mother     Depression Mother    [4]   Current Outpatient Medications   Medication Sig Dispense Refill    dicyclomine 10 MG Oral Cap Take 1 capsule (10 mg total) by mouth 4 (four) times daily. 360 capsule 3    ondansetron (ZOFRAN) 4 mg tablet Take 1 tablet (4 mg total) by mouth every 8 (eight) hours as needed for Nausea. 20 tablet 1    naproxen 500 MG Oral Tab Take 1 tablet (500 mg total) by mouth 2 (two) times daily with meals. 60 tablet 2    triamcinolone 0.1 % External Cream Apply topically 2 (two) times daily as needed. 60 g 1    QUEtiapine 100 MG Oral Tab  (Patient taking differently: 0.5 tablets (50 mg total).)     [5]   Allergies  Allergen Reactions    Paroxetine PALPITATIONS

## (undated) NOTE — LETTER
Date & Time: 11/9/2024, 9:33 AM  Patient: Justin Escudero  Encounter Provider(s):    Cinthia Swan APRN       To Whom It May Concern:    Justin Escudero was seen and treated in our department on 11/9/2024. He can return to work 11/11/24.    If you have any questions or concerns, please do not hesitate to call.        _____________________________  Physician/APC Signature

## (undated) NOTE — LETTER
02/13/24    To whom this may concern:    Justin is a patient under my care.  He has a longstanding history of achilles tendinitis.  For this reason, it should be taken into consideration that he can not stand for extended periods of time due to pain.  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  If you have any questions or concerns, please contact my office directly.        NATHANIEL Schreiber, FNP-BC

## (undated) NOTE — LETTER
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 Please excuse Jeff Fuel for 1 day. He may return on 3/20/2023. Activity is restricted as follows: none. If you require additional information please contact our office.         Sincerely,    [facility]ATHANIEL MOHAN, FNP-BC              Document generated by:  [facility]